# Patient Record
Sex: FEMALE | Race: WHITE | NOT HISPANIC OR LATINO | Employment: FULL TIME | URBAN - METROPOLITAN AREA
[De-identification: names, ages, dates, MRNs, and addresses within clinical notes are randomized per-mention and may not be internally consistent; named-entity substitution may affect disease eponyms.]

---

## 2021-04-04 PROBLEM — Z34.93 NORMAL PREGNANCY, THIRD TRIMESTER: Status: ACTIVE | Noted: 2021-04-04

## 2021-04-04 PROBLEM — O35.8XX0 PYELECTASIS OF FETUS ON PRENATAL ULTRASOUND: Status: ACTIVE | Noted: 2021-04-04

## 2021-04-04 PROBLEM — O35.EXX0 PYELECTASIS OF FETUS ON PRENATAL ULTRASOUND: Status: ACTIVE | Noted: 2021-04-04

## 2021-04-04 PROBLEM — R73.09 ABNORMAL GLUCOSE TOLERANCE TEST (GTT): Status: ACTIVE | Noted: 2021-04-04

## 2021-04-04 PROBLEM — Z86.69 HISTORY OF MIGRAINE DURING PREGNANCY: Status: ACTIVE | Noted: 2021-04-04

## 2021-04-04 PROBLEM — O09.299 HISTORY OF MATERNAL FOURTH DEGREE PERINEAL LACERATION, CURRENTLY PREGNANT: Status: ACTIVE | Noted: 2021-04-04

## 2021-04-04 PROBLEM — Z87.59 HISTORY OF MIGRAINE DURING PREGNANCY: Status: ACTIVE | Noted: 2021-04-04

## 2021-04-05 ENCOUNTER — ROUTINE PRENATAL (OUTPATIENT)
Dept: OBGYN CLINIC | Facility: CLINIC | Age: 31
End: 2021-04-05

## 2021-04-05 VITALS — DIASTOLIC BLOOD PRESSURE: 60 MMHG | WEIGHT: 172 LBS | SYSTOLIC BLOOD PRESSURE: 112 MMHG

## 2021-04-05 DIAGNOSIS — O35.8XX0 PYELECTASIS OF FETUS ON PRENATAL ULTRASOUND: ICD-10-CM

## 2021-04-05 DIAGNOSIS — Z34.93 NORMAL PREGNANCY, THIRD TRIMESTER: Primary | ICD-10-CM

## 2021-04-05 DIAGNOSIS — O09.299 HISTORY OF MATERNAL FOURTH DEGREE PERINEAL LACERATION, CURRENTLY PREGNANT: ICD-10-CM

## 2021-04-05 PROCEDURE — PNV: Performed by: STUDENT IN AN ORGANIZED HEALTH CARE EDUCATION/TRAINING PROGRAM

## 2021-04-05 RX ORDER — SUMATRIPTAN 100 MG/1
TABLET, FILM COATED ORAL
COMMUNITY

## 2021-04-05 NOTE — ASSESSMENT & PLAN NOTE
- Had previously desired elective  section  However, has now decided that she would like to proceed with trial of labor  Will cancel scheduled  section    - Given history, we discussed increased risk of complex obstetric laceration  Reviewed that risk of recurrence and range of sequelae are unpredictable  Given that she healed without residual symptoms following prior delivery, vaginal delivery is not unreasonable

## 2021-04-05 NOTE — Clinical Note
Patient has decided against elective primary  section and desires vaginal delivery  Please cancel previously-scheduled    Thanks

## 2021-04-05 NOTE — PROGRESS NOTES
Routine Prenatal Visit  [address]   [address] [facility], [place], [place]    Assessment/Plan:  She Yang is a 31y o  year old  at 31w1d who presents for routine prenatal visit  1  Normal pregnancy, third trimester  Assessment & Plan:  - PTL/PPROM/Bleeding precautions given  Kick counts reviewed  - Reviewed plan for collection of GBS swab at 36 weeks  - Problem list updated  - PMH, PSH, medications reviewed and updated as needed  - Return to office in 2 wk(s) for routine prenatal care        2  History of maternal fourth degree perineal laceration, currently pregnant  Assessment & Plan:  - Had previously desired elective  section  However, has now decided that she would like to proceed with trial of labor  Will cancel scheduled  section    - Given history, we discussed increased risk of complex obstetric laceration  Reviewed that risk of recurrence and range of sequelae are unpredictable  Given that she healed without residual symptoms following prior delivery, vaginal delivery is not unreasonable  3  Pyelectasis of fetus on prenatal ultrasound  Assessment & Plan:  - Will refer to MFM for consult and 3rd trimester ultrasound  Orders:  -     Ambulatory Referral to Maternal Fetal Medicine; Future; Expected date: 2021      Subjective:   Isaac Agusitn is a 31 y o  [facility] who presents for routine prenatal care at 34w4d  Complaints today: No  LOF: No; VB: No; Contractions: No; FM: Normal  Udip: Negative glucose, negative protein    Objective:  /60   Wt 78 kg (172 lb)   LMP 2020     General: Well appearing, no distress  Respiratory: Unlabored breathing  Cardiovascular: Regular rate  Abdomen: Soft, gravid, nontender  Fundal Height: 34 cm  Extremities: Warm and well perfused  Non tender  Pregravid Weight/BMI: Pregravid weight not on file (BMI Could not be calculated)  Current Weight: 78 kg (172 lb)   Total Weight Gain: Not found  Pre- Vitals      Most Recent Value   Prenatal Assessment   Movement  Present   Prenatal Vitals   Blood Pressure  112/60   Weight - Scale  78 kg (172 lb)   Urine Albumin/Glucose   Dilation/Effacement/Station   Vaginal Drainage   Edema           Mathieu Rosenberg MD  2021 2:20 PM

## 2021-04-05 NOTE — PATIENT INSTRUCTIONS
Pregnancy at [address] to [address]:   What changes are happening in my body? You may continue to have symptoms such as shortness of breath, heartburn, contractions, or swelling of your ankles and feet  You may be gaining about 1 pound a week now  How do I care for myself at this stage of my pregnancy? · Eat a variety of healthy foods  Healthy foods include fruits, vegetables, whole-grain breads, low-fat dairy foods, beans, lean meats, and fish  Drink liquids as directed  Ask how much liquid to drink each day and which liquids are best for you  Limit caffeine to less than 200 milligrams each day  Limit your intake of fish to 2 servings each week  Choose fish low in mercury such as canned light tuna, shrimp, salmon, cod, or tilapia  Do not  eat fish high in mercury such as swordfish, tilefish, ifrah mackerel, and shark  · Manage heartburn  by eating 4 or 5 small meals each day instead of large meals  Avoid spicy food  · Manage swelling  by lying down and putting your feet up  · Take prenatal vitamins as directed  Your need for certain vitamins and minerals, such as folic acid, increases during pregnancy  Prenatal vitamins provide some of the extra vitamins and minerals you need  Prenatal vitamins may also help to decrease the risk of certain birth defects  · Talk to your healthcare provider about exercise  Moderate exercise can help you stay fit  Your healthcare provider will help you plan an exercise program that is safe for you during pregnancy  · Do not smoke  Smoking increases your risk of a miscarriage and other health problems during your pregnancy  Smoking can cause your baby to be born too early or weigh less at birth  Ask your healthcare provider for information if you need help quitting  · Do not drink alcohol  Alcohol passes from your body to your baby through the placenta   It can affect your baby's brain development and cause fetal alcohol syndrome (FAS)  FAS is a group of conditions that causes mental, behavior, and growth problems  · Talk to your healthcare provider before you take any medicines  Many medicines may harm your baby if you take them when you are pregnant  Do not take any medicines, vitamins, herbs, or supplements without first talking to your healthcare provider  Never use illegal or street drugs (such as marijuana or cocaine) while you are pregnant  What are some safety tips during pregnancy? · Avoid hot tubs and saunas  Do not use a hot tub or sauna while you are pregnant, especially during your first trimester  Hot tubs and saunas may raise your baby's temperature and increase the risk of birth defects  · Avoid toxoplasmosis  This is an infection caused by eating raw meat or being around infected cat feces  It can cause birth defects, miscarriages, and other problems  Wash your hands after you touch raw meat  Make sure any meat is well-cooked before you eat it  Avoid raw eggs and unpasteurized milk  Use gloves or ask someone else to clean your cat's litter box while you are pregnant  What changes are happening with my baby? By 34 weeks, your baby may weigh more than 5 pounds  Your baby will be about 12 ½ inches long from the top of the head to the rump (baby's bottom)  Your baby is gaining about ½ pound a week  Your baby's eyes open and close now  Your baby's kicks and movements are more forceful at this time  What do I need to know about prenatal care? Your healthcare provider will check your blood pressure and weight  You may also need the following:  · A urine test  may also be done to check for sugar and protein  These can be signs of gestational diabetes or infection  Protein in your urine may also be a sign of preeclampsia  Preeclampsia is a condition that can develop during week 20 or later of your pregnancy  It causes high blood pressure, and it can cause problems with your kidneys and other organs       · A Tdap vaccine  may be recommended by your healthcare provider  · Fundal height  is a measurement of your uterus to check your baby's growth  This number is usually the same as the number of weeks that you have been pregnant  Your healthcare provider may also check your baby's position  · Your baby's heart rate  will be checked  When should I seek immediate care? · You develop a severe headache that does not go away  · You have new or increased vision changes, such as blurred or spotted vision  · You have new or increased swelling in your face or hands  · You have vaginal spotting or bleeding  · Your water broke or you feel warm water gushing or trickling from your vagina  When should I contact my healthcare provider? · You have more than 5 contractions in 1 hour  · You notice any changes in your baby's movements  · You have abdominal cramps, pressure, or tightening  · You have a change in vaginal discharge  · You have chills or a fever  · You have vaginal itching, burning, or pain  · You have yellow, green, white, or foul-smelling vaginal discharge  · You have pain or burning when you urinate, less urine than usual, or pink or bloody urine  · You have questions or concerns about your condition or care  CARE AGREEMENT:   You have the right to help plan your care  Learn about your health condition and how it may be treated  Discuss treatment options with your healthcare providers to decide what care you want to receive  You always have the right to refuse treatment  The above information is an  only  It is not intended as medical advice for individual conditions or treatments  Talk to your doctor, nurse or pharmacist before following any medical regimen to see if it is safe and effective for you  © Copyright [address] Information is for End User's use only and may not be sold, redistributed or otherwise used for commercial purposes   All illustrations and images included in CareNotes® are the copyrighted property of A D A M , Inc  or Grzegorz Eaton

## 2021-04-05 NOTE — ASSESSMENT & PLAN NOTE
- PTL/PPROM/Bleeding precautions given  Kick counts reviewed  - Reviewed plan for collection of GBS swab at 36 weeks    - Problem list updated  - PMH, PSH, medications reviewed and updated as needed  - Return to office in 2 wk(s) for routine prenatal care

## 2021-04-06 ENCOUNTER — TELEPHONE (OUTPATIENT)
Dept: PERINATAL CARE | Facility: CLINIC | Age: 31
End: 2021-04-06

## 2021-04-06 NOTE — TELEPHONE ENCOUNTER
I called patient to offer an appointment for 4/15/21 in our [place] office  I left her the appointment information and advised her to call us back to confirm

## 2021-04-13 ENCOUNTER — ROUTINE PRENATAL (OUTPATIENT)
Dept: OBGYN CLINIC | Facility: CLINIC | Age: 31
End: 2021-04-13

## 2021-04-13 VITALS — DIASTOLIC BLOOD PRESSURE: 66 MMHG | SYSTOLIC BLOOD PRESSURE: 110 MMHG | WEIGHT: 171.6 LBS

## 2021-04-13 DIAGNOSIS — O09.299 HISTORY OF MATERNAL FOURTH DEGREE PERINEAL LACERATION, CURRENTLY PREGNANT: ICD-10-CM

## 2021-04-13 DIAGNOSIS — R73.09 ABNORMAL GLUCOSE TOLERANCE TEST (GTT): ICD-10-CM

## 2021-04-13 DIAGNOSIS — Z87.59 HISTORY OF MIGRAINE DURING PREGNANCY: ICD-10-CM

## 2021-04-13 DIAGNOSIS — Z34.93 NORMAL PREGNANCY, THIRD TRIMESTER: ICD-10-CM

## 2021-04-13 DIAGNOSIS — Z3A.35 35 WEEKS GESTATION OF PREGNANCY: Primary | ICD-10-CM

## 2021-04-13 DIAGNOSIS — O35.8XX0 PYELECTASIS OF FETUS ON PRENATAL ULTRASOUND: ICD-10-CM

## 2021-04-13 DIAGNOSIS — Z86.69 HISTORY OF MIGRAINE DURING PREGNANCY: ICD-10-CM

## 2021-04-13 LAB
SL AMB  POCT GLUCOSE, UA: NEGATIVE
SL AMB POCT URINE PROTEIN: NEGATIVE

## 2021-04-13 PROCEDURE — PNV: Performed by: OBSTETRICS & GYNECOLOGY

## 2021-04-13 NOTE — PATIENT INSTRUCTIONS
LABOR PRECAUTIONS  Call our office for any of the following:    * I need to call immediately I if I have even a small amount of LIQUID  leaking from my vagina, with or without contractions  * I need to call if I am BLEEDING an amount equal to or more than a period  A small amount of bloody vaginal discharge is normal at the end of the pregnancy  * I need to call if I am having CONTRACTIONS  every five minutes for at least an hour  I will need a watch in order to time them  I should time them from the beginning of one contraction until the beginning of the next one  * I need to call BEFORE  I go to the hospital    * I need to have a plan for TRANSPORTATION  to get to the hospital when I am in labor  Labor is generally not an emergency which requires an ambulance  FETAL KICK COUNTS    In the third trimester (after 28 weeks gestation) you should be performing fetal kick counts every day  Your baby should move at least 10 times in 2 hours during an active time, once a day  Choose atime of day when your baby is most active  Try to do this around the same time each day  Get into a comfortable position and then write down the time your baby first moves  Count each movement until the baby moves 10 times  These movements include kicks, punches, nudges, flutters, or rolls  This can take anywhere from 5 minutes to 2 hours  Write down the time you feel the baby's 10th movement  If 2 hours has passed and your baby has not moved at least 10 times, you should CALL THE OFFICE RIGHT AWAY  PERINEAL / VAGINAL MASSAGE    What can I do now to decrease my chances of tearing during delivery? Massaging around the vaginal opening by you (or your partner), either antepartum (before birth) or during the second stage of labor, can reduce the likelihood of perineal tearing during childbirth    Likewise, the use of warm packs held on the perineum during the pushing stage of labor can reduce the severity of your tear  This will happen during the pushing stage of labor  At home, you can also help reduce the chances of injury that may occur during the birth of your child through perineal massage  When should I do this? Starting around or shortly after 34 weeks of pregnancy, you or your partner should provide 5-10 minutes of vaginal massage 1-4 times per week  How? Use either almond, coconut, or olive oil and water mixture on 1 or 2 fingers (depending on comfort)  Insert finger(s) 3-5cm into the vagina  Apply sweeping downward/sideward pressure from 3 to 9 o'clock for 5-10 minutes, 1-4 times per week  GROUP B STREP    Group B Strep (GBS) is a common vaginal bacteria  Approximately 25% of women normally have GBS bacteria present in the vagina  It is NOT a sexually-transmitted infection  In fact, it is not an infection AT ALL! It is just a normal vaginal bacteria for many women  However, the GBS bacteria can be dangerous to a  baby if the baby is exposed to that particular bacteria during labor and birth AND develops an infection from it  The likelihood of a  GBS infection for a woman who has GBS bacteria in the vagina is about 1%-2%  But if it does occur, a baby could become severely ill  For this reason, we do a vaginal culture (Q-tip swab of the vagina and rectum) for ALL pregnant women at approximately 36 weeks of pregnancy  If the culture shows that there is GBS bacteria present, it is NOT a reason to panic! Because in this situation we will give this woman antibiotics through her IV while she is in labor  When a mother is treated with antibiotics during labor and delivery, her baby ALMOST NEVER becomes infected with GBS bacteria

## 2021-04-13 NOTE — PROGRESS NOTES
Routine Prenatal Visit  [address], [address], [facility], [address] [facility]    Assessment/Plan:  William Sigala is a 31y o  year old  at 27w7d who presents for routine prenatal visit  1  35 weeks gestation of pregnancy  -     POCT urine dip    2  History of maternal fourth degree perineal laceration, currently pregnant    3  Normal pregnancy, third trimester    4  Abnormal glucose tolerance test (GTT)    5  Pyelectasis of fetus on prenatal ultrasound    6  History of migraine during pregnancy          Subjective:     CC: Prenatal care    Vin Dykes is a 31 y o   female who presents for routine prenatal care at 35w5d  Pregnancy ROS: No leakage of fluid, pelvic pain, or vaginal bleeding  Nml fetal movement  The following portions of the patient's history were reviewed and updated as appropriate: allergies, current medications, past family history, past medical history, obstetric history, gynecologic history, past social history, past surgical history and problem list       Objective:  /66   Wt 77 8 kg (171 lb 9 6 oz)   LMP 2020   Pregravid Weight/BMI: Pregravid weight not on file (BMI Could not be calculated)  Current Weight: 77 8 kg (171 lb 9 6 oz)   Total Weight Gain: Not found  Pre-Linnea Vitals      Most Recent Value   Prenatal Assessment   Fetal Heart Rate  150   Fundal Height (cm)  36 cm   Movement  Present   Presentation  Vertex   Prenatal Vitals   Blood Pressure  110/66   Weight - Scale  77 8 kg (171 lb 9 6 oz)   Urine Albumin/Glucose   Dilation/Effacement/Station   Vaginal Drainage   Draining Fluid  No   Edema           General: Well appearing, no distress  Abdomen: Soft, gravid, nontender  Fundal Height: Appropriate for gestational age  Extremities: Warm and well perfused  Non tender

## 2021-04-15 ENCOUNTER — ROUTINE PRENATAL (OUTPATIENT)
Dept: PERINATAL CARE | Facility: OTHER | Age: 31
End: 2021-04-15
Payer: COMMERCIAL

## 2021-04-15 VITALS
WEIGHT: 170.6 LBS | SYSTOLIC BLOOD PRESSURE: 118 MMHG | HEIGHT: 65 IN | HEART RATE: 89 BPM | DIASTOLIC BLOOD PRESSURE: 81 MMHG | BODY MASS INDEX: 28.42 KG/M2

## 2021-04-15 DIAGNOSIS — O09.299 HISTORY OF MATERNAL FOURTH DEGREE PERINEAL LACERATION, CURRENTLY PREGNANT: ICD-10-CM

## 2021-04-15 DIAGNOSIS — O35.8XX0 PYELECTASIS OF FETUS ON PRENATAL ULTRASOUND: ICD-10-CM

## 2021-04-15 DIAGNOSIS — R73.09 ABNORMAL GLUCOSE TOLERANCE TEST (GTT): ICD-10-CM

## 2021-04-15 DIAGNOSIS — Z86.69 HISTORY OF MIGRAINE DURING PREGNANCY: ICD-10-CM

## 2021-04-15 DIAGNOSIS — Z87.59 HISTORY OF MIGRAINE DURING PREGNANCY: ICD-10-CM

## 2021-04-15 DIAGNOSIS — O36.63X0 EXCESSIVE FETAL GROWTH AFFECTING MANAGEMENT OF PREGNANCY IN THIRD TRIMESTER, SINGLE OR UNSPECIFIED FETUS: Primary | ICD-10-CM

## 2021-04-15 DIAGNOSIS — Z34.93 NORMAL PREGNANCY, THIRD TRIMESTER: ICD-10-CM

## 2021-04-15 DIAGNOSIS — Z3A.36 36 WEEKS GESTATION OF PREGNANCY: ICD-10-CM

## 2021-04-15 PROBLEM — F41.9 ANXIETY: Status: ACTIVE | Noted: 2021-04-15

## 2021-04-15 PROCEDURE — 99203 OFFICE O/P NEW LOW 30 MIN: CPT | Performed by: OBSTETRICS & GYNECOLOGY

## 2021-04-15 PROCEDURE — 76811 OB US DETAILED SNGL FETUS: CPT | Performed by: OBSTETRICS & GYNECOLOGY

## 2021-04-15 NOTE — LETTER
April 15, 2021     MD [address]  [address] [facility] [address]  [facility] [address]    Patient: Courtney Ford   YOB: 1990   Date of Visit: 4/15/2021       Dear Dr Nic Sorto:    Thank you for referring Courtney Ford to me for evaluation  Below are my notes for this consultation  If you have questions, please do not hesitate to call me  I look forward to following your patient along with you  Sincerely,        Dallin Evangelista MD        CC: No Recipients  Dallin Evangelista MD  4/15/2021  4:23 PM  Sign when Signing Visit    On exam today the patient appears well, in no acute distress, and denies any complaints  Her abdomen is non-tender  Her history was reviewed  This is her 2nd pregnancy  Pregnancy has been complicated by bilateral fetal pyelectasis noted at an office ultrasound  She currently takes Zoloft for depression and prenatal vitamins  She has no known drug allergies  She has a history of a prior vaginal delivery complicated by 4th degree laceration in 2018  That delivery occurred at 41 weeks gestation  She has no significant substance use or family medical history  Review of systems is otherwise negative  Today's ultrasound is limited by gestational age and fetal position; therefore, the fetal anatomic survey could not be completed  Mild right-sided urinary tract dilation is appreciated today  Patient informed me that prior ultrasounds demonstrated bilateral pyelectasis  Good fetal movement and tone are seen  The amniotic fluid volume appears normal   The overall estimated fetal weight is at the 97th percentile with the abdominal circumference greater than the 97th percentile  She was informed of today's findings and all of her questions were answered  The limitations of ultrasound were reviewed  We discussed today's biometry and biometry is consistent with impending macrosomia    We discussed that a large abdominal circumference is seen more commonly in patients with poorly controlled diabetes  She does not have diabetes  She does inform me that she underwent a 3 hour glucose tolerance test and did have 1 elevated level  I discussed with her that recent data suggest that a single elevated level on a 3 hour glucose tolerance test may have similar implications to a traditional diagnosis of gestational diabetes  Given her gestational age, I recommend she improve her diet by lowering carbohydrate intake and improving protein intake  I encouraged her to further discuss with her doctor mode of delivery given her prior 4th degree laceration  She had previously planned  delivery  We discussed follow-up in detail and I recommend she return as clinically indicated  I did suggest notification of Pediatrics to ensure  evaluation of the urinary tract dilation occurs at 4-6 weeks of life    Please note, medical decision making complexity:  Moderate    Thank you very much for allowing us to participate in the care of this very nice patient  Should you have any questions, please do not hesitate to contact me

## 2021-04-15 NOTE — PROGRESS NOTES
On exam today the patient appears well, in no acute distress, and denies any complaints  Her abdomen is non-tender  Her history was reviewed  This is her 2nd pregnancy  Pregnancy has been complicated by bilateral fetal pyelectasis noted at an office ultrasound  She currently takes Zoloft for depression and prenatal vitamins  She has no known drug allergies  She has a history of a prior vaginal delivery complicated by 4th degree laceration in 2018  That delivery occurred at 41 weeks gestation  She has no significant substance use or family medical history  Review of systems is otherwise negative  Today's ultrasound is limited by gestational age and fetal position; therefore, the fetal anatomic survey could not be completed  Mild right-sided urinary tract dilation is appreciated today  Patient informed me that prior ultrasounds demonstrated bilateral pyelectasis  Good fetal movement and tone are seen  The amniotic fluid volume appears normal   The overall estimated fetal weight is at the 97th percentile with the abdominal circumference greater than the 97th percentile  She was informed of today's findings and all of her questions were answered  The limitations of ultrasound were reviewed  We discussed today's biometry and biometry is consistent with impending macrosomia  We discussed that a large abdominal circumference is seen more commonly in patients with poorly controlled diabetes  She does not have diabetes  She does inform me that she underwent a 3 hour glucose tolerance test and did have 1 elevated level  I discussed with her that recent data suggest that a single elevated level on a 3 hour glucose tolerance test may have similar implications to a traditional diagnosis of gestational diabetes  Given her gestational age, I recommend she improve her diet by lowering carbohydrate intake and improving protein intake    I encouraged her to further discuss with her doctor mode of delivery given her prior 4th degree laceration  She had previously planned  delivery  We discussed follow-up in detail and I recommend she return as clinically indicated  I did suggest notification of Pediatrics to ensure  evaluation of the urinary tract dilation occurs at 4-6 weeks of life    Please note, medical decision making complexity:  Moderate    Thank you very much for allowing us to participate in the care of this very nice patient  Should you have any questions, please do not hesitate to contact me

## 2021-04-17 PROBLEM — O36.63X1 LARGE FOR GESTATIONAL AGE FETUS AFFECTING MANAGEMENT OF MOTHER, ANTEPARTUM, THIRD TRIMESTER, FETUS 1: Status: ACTIVE | Noted: 2021-04-17

## 2021-04-20 ENCOUNTER — ROUTINE PRENATAL (OUTPATIENT)
Dept: OBGYN CLINIC | Facility: CLINIC | Age: 31
End: 2021-04-20

## 2021-04-20 VITALS
WEIGHT: 174 LBS | DIASTOLIC BLOOD PRESSURE: 72 MMHG | HEIGHT: 65 IN | SYSTOLIC BLOOD PRESSURE: 120 MMHG | BODY MASS INDEX: 28.99 KG/M2

## 2021-04-20 DIAGNOSIS — O36.63X0 EXCESSIVE FETAL GROWTH AFFECTING MANAGEMENT OF PREGNANCY IN THIRD TRIMESTER, SINGLE OR UNSPECIFIED FETUS: Primary | ICD-10-CM

## 2021-04-20 DIAGNOSIS — O35.8XX0 PYELECTASIS OF FETUS ON PRENATAL ULTRASOUND: ICD-10-CM

## 2021-04-20 DIAGNOSIS — Z3A.36 36 WEEKS GESTATION OF PREGNANCY: ICD-10-CM

## 2021-04-20 DIAGNOSIS — R73.09 ABNORMAL GLUCOSE TOLERANCE TEST (GTT): ICD-10-CM

## 2021-04-20 DIAGNOSIS — O09.299 HISTORY OF MATERNAL FOURTH DEGREE PERINEAL LACERATION, CURRENTLY PREGNANT: ICD-10-CM

## 2021-04-20 LAB
SL AMB  POCT GLUCOSE, UA: NEGATIVE
SL AMB POCT URINE PROTEIN: NEGATIVE

## 2021-04-20 PROCEDURE — PNV: Performed by: OBSTETRICS & GYNECOLOGY

## 2021-04-20 NOTE — PROGRESS NOTES
Routine Prenatal Visit  [address] [facility], [address], [facility], [address] [facility] [address]    Assessment/Plan:  She Yang is a 31y o  year old  at 44w9d who presents for routine prenatal visit  1  Excessive fetal growth affecting management of pregnancy in third trimester, single or unspecified fetus  Assessment & Plan:  EFW on U/S was 3501gm (97%) with the AC >97%  Pt would like to reschedule the C/S that she recently canceled  Will notify scheduling      2  History of maternal fourth degree perineal laceration, currently pregnant  Assessment & Plan:  Pt has now decided to reschedule C/S in light of recent LGA U/S  Should she go into spontaneous labor prior she may consider       3  Abnormal glucose tolerance test (GTT)    4  Pyelectasis of fetus on prenatal ultrasound    5  36 weeks gestation of pregnancy  Comments:  delivery consent obtained, GBS done  Orders:  -     POCT urine dip  -     Strep B DNA probe, amplification        Subjective:     CC: Prenatal care    Isaac Agustin is a 31 y o   female who presents for routine prenatal care at 36w5d  Pregnancy ROS: no leakage of fluid, pelvic pain, or vaginal bleeding  normal fetal movement  The following portions of the patient's history were reviewed and updated as appropriate: allergies, current medications, past family history, past medical history, obstetric history, gynecologic history, past social history, past surgical history and problem list       Objective:  /72   Ht 5' 5" (1 651 m)   Wt 78 9 kg (174 lb)   LMP 2020   BMI 28 96 kg/m²   Pregravid Weight/BMI: Pregravid weight not on file (BMI Could not be calculated)  Current Weight: 78 9 kg (174 lb)   Total Weight Gain: Not found     Pre-Linnea Vitals      Most Recent Value   Prenatal Assessment   Fetal Heart Rate  155   Fundal Height (cm)  38 cm   Movement  Present   Presentation  Vertex   Prenatal Vitals   Blood Pressure  120/72   Weight - Scale  78 9 kg (174 lb)   Urine Albumin/Glucose   Dilation/Effacement/Station   Cervical Dilation  2   Cervical Effacement  50   Fetal Station  -3   Vaginal Drainage   Edema           General: Well appearing, no distress  Respiratory: Unlabored breathing  Cardiovascular: Regular rate  Abdomen: Soft, gravid, nontender  Fundal Height: Appropriate for gestational age  Extremities: Warm and well perfused  Non tender

## 2021-04-20 NOTE — ASSESSMENT & PLAN NOTE
Pt has now decided to reschedule C/S in light of recent LGA U/S   Should she go into spontaneous labor prior she may consider

## 2021-04-21 NOTE — ASSESSMENT & PLAN NOTE
EFW on U/S was 3501gm (97%) with the AC >97%  Pt would like to reschedule the C/S that she recently canceled    Will notify scheduling

## 2021-04-22 LAB — GP B STREP DNA SPEC QL NAA+PROBE: NEGATIVE

## 2021-04-28 ENCOUNTER — ROUTINE PRENATAL (OUTPATIENT)
Dept: OBGYN CLINIC | Facility: CLINIC | Age: 31
End: 2021-04-28

## 2021-04-28 VITALS
BODY MASS INDEX: 28.52 KG/M2 | WEIGHT: 171.2 LBS | HEIGHT: 65 IN | SYSTOLIC BLOOD PRESSURE: 122 MMHG | DIASTOLIC BLOOD PRESSURE: 60 MMHG

## 2021-04-28 DIAGNOSIS — R73.09 ABNORMAL GLUCOSE TOLERANCE TEST (GTT): ICD-10-CM

## 2021-04-28 DIAGNOSIS — Z34.93 NORMAL PREGNANCY, THIRD TRIMESTER: Primary | ICD-10-CM

## 2021-04-28 DIAGNOSIS — O36.63X0 EXCESSIVE FETAL GROWTH AFFECTING MANAGEMENT OF PREGNANCY IN THIRD TRIMESTER, SINGLE OR UNSPECIFIED FETUS: ICD-10-CM

## 2021-04-28 DIAGNOSIS — O09.299 HISTORY OF MATERNAL FOURTH DEGREE PERINEAL LACERATION, CURRENTLY PREGNANT: ICD-10-CM

## 2021-04-28 DIAGNOSIS — O35.8XX0 PYELECTASIS OF FETUS ON PRENATAL ULTRASOUND: ICD-10-CM

## 2021-04-28 DIAGNOSIS — O36.63X1 LARGE FOR GESTATIONAL AGE FETUS AFFECTING MANAGEMENT OF MOTHER, ANTEPARTUM, THIRD TRIMESTER, FETUS 1: ICD-10-CM

## 2021-04-28 DIAGNOSIS — Z3A.37 37 WEEKS GESTATION OF PREGNANCY: ICD-10-CM

## 2021-04-28 DIAGNOSIS — Z86.69 HISTORY OF MIGRAINE DURING PREGNANCY: ICD-10-CM

## 2021-04-28 DIAGNOSIS — Z87.59 HISTORY OF MIGRAINE DURING PREGNANCY: ICD-10-CM

## 2021-04-28 LAB
SL AMB  POCT GLUCOSE, UA: NEGATIVE
SL AMB POCT URINE PROTEIN: NEGATIVE

## 2021-04-28 PROCEDURE — PNV: Performed by: STUDENT IN AN ORGANIZED HEALTH CARE EDUCATION/TRAINING PROGRAM

## 2021-04-28 NOTE — PATIENT INSTRUCTIONS
AMBULATORY CARE:   What you need to know about a :  A , or  section, is abdominal surgery to deliver your baby  How to prepare for a :   · Your obstetrician will talk to you about how to prepare if you are having a planned   He or she may tell you not to eat or drink anything after midnight on the day of your surgery  He or she will tell you what medicines to take or not take on the day of your surgery  · Tell your healthcare provider if you had an allergic reaction to anesthesia or antibiotics  Antibiotics may be given an hour before surgery or right after it starts  Antibiotics help fight or prevent a bacterial infection  You may need tests for certain infections that can be passed to your baby, such as group B strep (GBS)  If you are a GBS carrier or at increased risk, antibiotics help prevent your baby from being infected during the   What will happen during a :  You will usually be given spinal anesthesia to numb you from the surgery area down  You may still feel pressure or pushing during the , but you should not feel any pain  Your obstetrician will usually make an incision across your lower abdomen  He or she will gently pull your baby out  Your incision will be closed with stitches or staples and covered with a bandage  What will happen after a :   · You will be taken to a room to rest for about an hour after you deliver  · Do not get out of bed until healthcare providers say it is okay  · Call for a healthcare provider if you are holding your baby and start to feel tired  The provider can put him or her in a bassinet near you while you rest or sleep  This will help prevent an accidental drop or fall of your baby  Risks of a :  You may bleed more than expected or develop an infection  Your bladder or intestines may be injured during the procedure  You may get a blood clot in your leg   This may become life-threatening  Call your local emergency number (911 in the [address]) if:   · You feel lightheaded, short of breath, and have chest pain  · You cough up blood  Call your obstetrician if:   · Blood soaks through your bandage  · Your stitches come apart  · Your arm or leg feels warm, tender, and painful  It may look swollen and red  · You have heavy vaginal bleeding that fills 1 or more sanitary pads in 1 hour  · You have a fever  · Your incision is swollen, red, or draining pus  · You have questions or concerns about yourself or your baby  Medicines: You may  need any of the following:  · Prescription pain medicine  may be given  Ask your healthcare provider how to take this medicine safely  Some prescription pain medicines contain acetaminophen  Do not take other medicines that contain acetaminophen without talking to your healthcare provider  Too much acetaminophen may cause liver damage  Prescription pain medicine may cause constipation  Ask your healthcare provider how to prevent or treat constipation  · Acetaminophen  decreases pain and fever  It is available without a doctor's order  Ask how much to take and how often to take it  Follow directions  Read the labels of all other medicines you are using to see if they also contain acetaminophen, or ask your doctor or pharmacist  Acetaminophen can cause liver damage if not taken correctly  Do not use more than 4 grams (4,000 milligrams) total of acetaminophen in one day  · NSAIDs , such as ibuprofen, help decrease swelling, pain, and fever  NSAIDs can cause stomach bleeding or kidney problems in certain people  If you take blood thinner medicine, always ask your healthcare provider if NSAIDs are safe for you  Always read the medicine label and follow directions  Wound care:  Carefully wash your incision wound with soap and water every day  Keep your wound clean and dry   Wear loose, comfortable clothes that do not rub against your wound  Ask about bathing and showering  Limit activity as directed:   · Ask when it is safe for you to drive, walk up stairs, lift heavy objects, and have sex  · Ask when it is okay to exercise, and what types of exercise to do  Start slowly and do more as you get stronger  Drink liquids as directed:  Liquids help keep you hydrated after your procedure and decrease your risk for a blood clot  Ask how much liquid to drink each day and which liquids are best for you  Follow up with your obstetrician as directed: You may need to return to have your stitches or staples removed  Write down your questions so you remember to ask them during your visits  © Copyright [address] Information is for End User's use only and may not be sold, redistributed or otherwise used for commercial purposes  All illustrations and images included in CareNotes® are the copyrighted property of A D A M , Inc  or [facility] Tammi Cornell   The above information is an  only  It is not intended as medical advice for individual conditions or treatments  Talk to your doctor, nurse or pharmacist before following any medical regimen to see if it is safe and effective for you

## 2021-04-28 NOTE — ASSESSMENT & PLAN NOTE
- Labor/Bleeding/ROM precautions given  Kick counts reviewed  - GBS negative result reviewed  - Scheduled for primary  section 2021 due to [facility] and history of 4th degree laceration  Surgical consent reviewed and signed with patient today  Discussed risks of surgery, including bleeding, infection, damage to surrounding organs/structures (specifically vessels, nerves, bowel, bladder), scar tissue formation, hernia, and need for further surgery  All questions answered  Patient agrees to proceed with surgery     - Problem list updated  - PMH, PSH, medications reviewed and updated as needed  - Return to office in 1 wk(s) for routine prenatal care

## 2021-04-28 NOTE — PROGRESS NOTES
Routine Prenatal Visit  [address] [facility], [address], [facility], [address] [facility]    Assessment/Plan:  Ketan Mendez is a 31y o  year old  at 41w10d who presents for routine prenatal visit  1  Normal pregnancy, third trimester  Assessment & Plan:  - Labor/Bleeding/ROM precautions given  Kick counts reviewed  - GBS negative result reviewed  - Scheduled for primary  section 2021 due to [facility] and history of 4th degree laceration  Surgical consent reviewed and signed with patient today  Discussed risks of surgery, including bleeding, infection, damage to surrounding organs/structures (specifically vessels, nerves, bowel, bladder), scar tissue formation, hernia, and need for further surgery  All questions answered  Patient agrees to proceed with surgery  - Problem list updated  - PMH, PSH, medications reviewed and updated as needed  - Return to office in 1 wk(s) for routine prenatal care        2  History of maternal fourth degree perineal laceration, currently pregnant    3  Abnormal glucose tolerance test (GTT)    4  Pyelectasis of fetus on prenatal ultrasound    5  History of migraine during pregnancy    6  Large for gestational age fetus affecting management of mother, antepartum, third trimester, fetus 1    9  40 weeks gestation of pregnancy  -     POCT urine dip    8  Excessive fetal growth affecting management of pregnancy in third trimester, single or unspecified fetus        Subjective:   Traci Regalado is a 31 y o  Pollie Mages who presents for routine prenatal care at 37w6d  Complaints today: No  LOF: No; VB: No; Contractions: No; FM: Present and normal    Objective:  /60   Ht 5' 5" (1 651 m)   Wt 77 7 kg (171 lb 3 2 oz)   LMP 2020   BMI 28 49 kg/m²     General: Well appearing, no distress  Respiratory: Unlabored breathing  Cardiovascular: Regular rate  Abdomen: Soft, gravid, nontender  Extremities: Warm and well perfused  Non tender      Pregravid Weight/BMI: 65 3 kg (144 lb) (BMI 23 96)  Current Weight: 77 7 kg (171 lb 3 2 oz)   Total Weight Gain: 12 3 kg (27 lb 3 2 oz)     Pre- Vitals      Most Recent Value   Prenatal Assessment   Fetal Heart Rate  135   Fundal Height (cm)  38 cm   Movement  Present   Presentation  Vertex   Prenatal Vitals   Blood Pressure  122/60   Weight - Scale  77 7 kg (171 lb 3 2 oz)   Urine Albumin/Glucose   Dilation/Effacement/Station   Vaginal Drainage   Draining Fluid  No   Edema   LLE Edema  Trace   RLE Edema  Trace   Facial Edema  None           Maryam Borges MD  2021 7:47 PM

## 2021-05-03 ENCOUNTER — TELEPHONE (OUTPATIENT)
Dept: OBGYN CLINIC | Facility: CLINIC | Age: 31
End: 2021-05-03

## 2021-05-03 ENCOUNTER — DOCUMENTATION (OUTPATIENT)
Dept: OBGYN CLINIC | Facility: CLINIC | Age: 31
End: 2021-05-03

## 2021-05-03 DIAGNOSIS — Z86.69 HISTORY OF MIGRAINE DURING PREGNANCY: ICD-10-CM

## 2021-05-03 DIAGNOSIS — O35.8XX0 PYELECTASIS OF FETUS ON PRENATAL ULTRASOUND: ICD-10-CM

## 2021-05-03 DIAGNOSIS — O36.63X0 EXCESSIVE FETAL GROWTH AFFECTING MANAGEMENT OF PREGNANCY IN THIRD TRIMESTER, SINGLE OR UNSPECIFIED FETUS: ICD-10-CM

## 2021-05-03 DIAGNOSIS — O36.63X1 LARGE FOR GESTATIONAL AGE FETUS AFFECTING MANAGEMENT OF MOTHER, ANTEPARTUM, THIRD TRIMESTER, FETUS 1: ICD-10-CM

## 2021-05-03 DIAGNOSIS — Z34.93 NORMAL PREGNANCY, THIRD TRIMESTER: ICD-10-CM

## 2021-05-03 DIAGNOSIS — O09.299 HISTORY OF MATERNAL FOURTH DEGREE PERINEAL LACERATION, CURRENTLY PREGNANT: Primary | ICD-10-CM

## 2021-05-03 DIAGNOSIS — Z87.59 HISTORY OF MIGRAINE DURING PREGNANCY: ICD-10-CM

## 2021-05-03 DIAGNOSIS — O09.299 HISTORY OF MATERNAL FOURTH DEGREE PERINEAL LACERATION, CURRENTLY PREGNANT: ICD-10-CM

## 2021-05-03 DIAGNOSIS — Z3A.38 38 WEEKS GESTATION OF PREGNANCY: ICD-10-CM

## 2021-05-03 DIAGNOSIS — R73.09 ABNORMAL GLUCOSE TOLERANCE TEST (GTT): ICD-10-CM

## 2021-05-03 NOTE — TELEPHONE ENCOUNTER
Patient returned call she stating her contractions are getting closer together 5-7 mins apart and lasting for 30 sec-1 min now  She still denies LOF, but noticing decrease fetal movement now  Advised for her to proceed to L/D at [facility] now for further eval  Pt states she is 5 mins away from the hospital and will head over now  Called Dr Vish Arnold (on-call) and provided report  Pt's delivery consent form with Epic pregnancy episode summary faxed to [address] [place] [address] L/D with fax confirmation

## 2021-05-03 NOTE — TELEPHONE ENCOUNTER
Patient called stating she is unsure if she need to go to L/D or not  Starting at 9 am she having contractions about 10-15 mins apart and lasting about 30-40 seconds  She states the contractions is not to bad and she can talk through them  Denies LOF, and noted good fetal movement  She states currently 38 4 wks, scheduled for a  for this 21 and this is her second baby  She is currently at work right now  Advised pt will contact Dr Katty Velazquez (on-call) for recommendation and will call her back  Spoke w/ Dr Katty Velazquez and he states to have patient to continue to monitor and if her contractions are closer together about 5-7 mins apart and lasting for one minute or/and having LOF to please proceed straight to L/D  Called and spoke with pt and advised of the recommendations to continue to monitor  Pt voiced appreciation

## 2021-06-22 ENCOUNTER — POSTPARTUM VISIT (OUTPATIENT)
Dept: OBGYN CLINIC | Facility: CLINIC | Age: 31
End: 2021-06-22

## 2021-06-22 VITALS
BODY MASS INDEX: 25.79 KG/M2 | DIASTOLIC BLOOD PRESSURE: 68 MMHG | HEIGHT: 65 IN | SYSTOLIC BLOOD PRESSURE: 118 MMHG | WEIGHT: 154.8 LBS

## 2021-06-22 PROCEDURE — 99024 POSTOP FOLLOW-UP VISIT: CPT | Performed by: OBSTETRICS & GYNECOLOGY

## 2021-06-26 NOTE — PROGRESS NOTES
[address], [address], [facility], [address] [facility]    Assessment/Plan:  Keyana Cisneros is a 31y o  year old  who presents for postpartum visit  Routine Postpartum Care  1  Normal postpartum exam  2  Contraception: condoms  3  Depression Sbreast  4  Psychosocial support: good  5  Patient Education: "[facility] [employer]"  6  Cervical cancer screening Up to Date, next due   7  Follow up in: 3 months or as needed  Additional Problems: There are no diagnoses linked to this encounter  Subjective:     CC: Postpartum visit    Mavis Fragoso is a 31 y o  y o  female  who presents for a postpartum visit  She is 7 weeks postpartum following a low cervical transverse  section on 5/3/21 at 38 weeks  Outcome: primary  section, low transverse incision  Anesthesia: spinal  Postpartum course has been unremarkable  Baby's course has been unremarkable  Baby is feeding by breast      Bleeding no bleeding  Bowel function is normal  Bladder function is normal  Patient is not sexually active  Contraception method is condoms  Postpartum depression screening: negative      The following portions of the patient's history were reviewed and updated as appropriate: allergies, current medications, past family history, past medical history, obstetric history, gynecologic history, past social history, past surgical history and problem list       Objective:  /68 (BP Location: Left arm, Patient Position: Sitting, Cuff Size: Standard)   Ht 5' 4 75" (1 645 m)   Wt 70 2 kg (154 lb 12 8 oz)   LMP 2020   Breastfeeding Yes   BMI 25 96 kg/m²   Pregravid Weight/BMI: 65 3 kg (144 lb) (BMI 24 14)  Current Weight: 70 2 kg (154 lb 12 8 oz)   Total Weight Gain: 4 899 kg (10 lb 12 8 oz)   Pre- Vitals      Most Recent Value   Prenatal Assessment   Prenatal Vitals   Blood Pressure  118/68   Weight - Scale  70 2 kg (154 lb 12 8 oz)   Urine Albumin/Glucose   Dilation/Effacement/Station   Vaginal Drainage   Edema           General: Well appearing, no distress  Mood and affect: Appropriate  Abdomen: Soft, nontender  Thyroid: No masses  Incision: well healed  Vulva: Well healed  Vagina: Well healed  No lesions  Urethra: Normal  Cervix: Healed, no lesions  Uterus: Normal size, no masses  Adnexa: No pain or masses  Extremities: Warm and well perfused  Non tender

## 2021-07-09 NOTE — PROGRESS NOTES
Delivered viable male @ 1904 8 and 9 apgars via LTCS, done for history of 4th degree laceration  Pt presented in spontaneous labor   Assist Dr Pool

## 2021-09-02 ENCOUNTER — TELEPHONE (OUTPATIENT)
Dept: OBGYN CLINIC | Facility: CLINIC | Age: 31
End: 2021-09-02

## 2021-09-02 NOTE — TELEPHONE ENCOUNTER
Pt called to inquire if a new order from [employer]  for a breast pump had poly received  Pt reports her old breast pump is no longer working and [employer] will provider a new breast pump but will need a new order  Order has not been received, pt will have [employer] refax an order

## 2021-09-27 ENCOUNTER — TELEPHONE (OUTPATIENT)
Dept: OBGYN CLINIC | Facility: CLINIC | Age: 31
End: 2021-09-27

## 2021-09-27 NOTE — TELEPHONE ENCOUNTER
[facility]   [facility]ed to [facility] to [facility] to chart

## 2021-09-28 ENCOUNTER — ANNUAL EXAM (OUTPATIENT)
Dept: OBGYN CLINIC | Facility: CLINIC | Age: 31
End: 2021-09-28
Payer: COMMERCIAL

## 2021-09-28 VITALS
SYSTOLIC BLOOD PRESSURE: 138 MMHG | BODY MASS INDEX: 25.66 KG/M2 | HEIGHT: 65 IN | WEIGHT: 154 LBS | DIASTOLIC BLOOD PRESSURE: 80 MMHG

## 2021-09-28 DIAGNOSIS — Z01.419 ROUTINE GYNECOLOGICAL EXAMINATION: Primary | ICD-10-CM

## 2021-09-28 PROCEDURE — S0612 ANNUAL GYNECOLOGICAL EXAMINA: HCPCS | Performed by: OBSTETRICS & GYNECOLOGY

## 2021-10-08 PROBLEM — O35.EXX0 PYELECTASIS OF FETUS ON PRENATAL ULTRASOUND: Status: RESOLVED | Noted: 2021-04-04 | Resolved: 2021-10-08

## 2021-10-08 PROBLEM — O36.63X0 EXCESSIVE FETAL GROWTH AFFECTING MANAGEMENT OF PREGNANCY IN THIRD TRIMESTER: Status: RESOLVED | Noted: 2021-04-15 | Resolved: 2021-10-08

## 2021-10-08 PROBLEM — O35.8XX0 PYELECTASIS OF FETUS ON PRENATAL ULTRASOUND: Status: RESOLVED | Noted: 2021-04-04 | Resolved: 2021-10-08

## 2021-10-08 PROBLEM — Z34.93 NORMAL PREGNANCY, THIRD TRIMESTER: Status: RESOLVED | Noted: 2021-04-04 | Resolved: 2021-10-08

## 2021-10-08 PROBLEM — O36.63X1 LARGE FOR GESTATIONAL AGE FETUS AFFECTING MANAGEMENT OF MOTHER, ANTEPARTUM, THIRD TRIMESTER, FETUS 1: Status: RESOLVED | Noted: 2021-04-17 | Resolved: 2021-10-08

## 2022-08-17 ENCOUNTER — INITIAL PRENATAL (OUTPATIENT)
Dept: OBGYN CLINIC | Facility: CLINIC | Age: 32
End: 2022-08-17

## 2022-08-17 VITALS
SYSTOLIC BLOOD PRESSURE: 120 MMHG | BODY MASS INDEX: 24.29 KG/M2 | DIASTOLIC BLOOD PRESSURE: 70 MMHG | HEIGHT: 65 IN | WEIGHT: 145.8 LBS

## 2022-08-17 DIAGNOSIS — Z36.89 ENCOUNTER FOR OTHER SPECIFIED ANTENATAL SCREENING: Primary | ICD-10-CM

## 2022-08-17 DIAGNOSIS — O34.211 MATERNAL CARE DUE TO LOW TRANSVERSE UTERINE SCAR FROM PREVIOUS CESAREAN DELIVERY: ICD-10-CM

## 2022-08-17 DIAGNOSIS — N91.2 AMENORRHEA: Primary | ICD-10-CM

## 2022-08-17 DIAGNOSIS — O09.299 HISTORY OF MATERNAL FOURTH DEGREE PERINEAL LACERATION, CURRENTLY PREGNANT: ICD-10-CM

## 2022-08-17 DIAGNOSIS — F41.9 ANXIETY DURING PREGNANCY: ICD-10-CM

## 2022-08-17 DIAGNOSIS — Z3A.10 10 WEEKS GESTATION OF PREGNANCY: ICD-10-CM

## 2022-08-17 DIAGNOSIS — O99.340 ANXIETY DURING PREGNANCY: ICD-10-CM

## 2022-08-17 LAB
EXTERNAL CHLAMYDIA SCREEN: NORMAL
EXTERNAL GONORRHEA SCREEN: NORMAL
SL AMB  POCT GLUCOSE, UA: NEGATIVE
SL AMB POCT URINE PROTEIN: NEGATIVE

## 2022-08-17 NOTE — ASSESSMENT & PLAN NOTE
- Reviewed risks of SSRI in pregnancy, including delayed  transition, persistent pulmonary hypertension, and NICU admission  Given stability of symptoms on Lexapro, patient will continue in pregnancy

## 2022-08-17 NOTE — PROGRESS NOTES
OB INTAKE INTERVIEW  Patient is 34 y o  who presents for OB intake at 10 4wks  She is accompanied by: Self  The father of her baby Kassandra Sams) is involved in the pregnancy    Last Menstrual Period: 2022  Ultrasound: Measured 10 weeks 4 days on 2022  Estimated Date of Delivery: 2023 confirmed by ultrasound  Signs/Symptoms of Pregnancy  Current pregnancy symptoms: nausea, tiredness  Constipation no  Headaches no  Cramping/spotting YES (on and off cramping; denies spotting)   PICA cravings no    Diabetes-  Body mass index is 24 45 kg/m²  If patient has 1 or more, please order early 1 hour GTT  History of GDM no  BMI >35 no  History of PCOS or current metformin use no  History of LGA/macrosomic infant (4000g/9lbs) no    If patient has 2 or more, please order early 1 hour GTT  BMI>30 no  AMA no  First degree relative with type 2 diabetes no  History of chronic HTN, hyperlipidemia, elevated A1C no  High risk race (, , ,  or ) no    Hypertension- if you answer yes, please order preeclampsia labs (cbc, comprehensive metabolic panel, urine protein creatinine ratio, uric acid)  History of of chronic HTN no  History of gestational HTN no  History of preeclampsia, eclampsia, or HELLP syndrome no  History of diabetes no  History of lupus, autoimmune disease, kidney disease no    Thyroid- if yes order TSH with reflex T4  History of thyroid disease no    Bleeding Disorder or Hx of DVT-patient or first degree relative with history of  Order the following if not done previously     (Factor V, antithrombin III, prothrombin gene mutation, protein C and S Ag, lupus anticoagulant, anticardiolipin, beta-2 glycoprotein)   no    OB/GYN-  History of abnormal pap smear no  History of HPV no  History of Herpes/HSV no  History of other STI (gonorrhea, chlamydia, trich) YES  History of prior  YES  History of prior  YES  History of  delivery prior to 36 weeks 6 days no  History of blood transfusion no  Ok for blood transfusion -Yes     Substance screening- if yes outside of tobacco for her or anyone in her home-order urine drug screen  History of tobacco use no  Currently using tobacco no  Currently using alcohol no  Presently using drugs no  Past drug use  no  IV drug use-If yes add Hep C antibody to labs no  Partner drug use no  Parent/Family drug use no    MRSA Screening-   Does the pt have a hx of MRSA? no  If yes- please follow MRSA protocol and obtain a nasal swab for MRSA culture    Immunizations:  Influenza vaccine given this season -Yes, aware will be given in Fall 2022  Discussed Tdap vaccine -Yes, aware will be given at 28 wks   Discussed COVID Vaccine -Yes, vaccinated  Genetic/MFM-  Do you or your partner have a history of any of the following in yourselves or first degree relatives? Cystic fibrosis no  Spinal muscular atrophy no  Hemoglobinopathy/Sickle Cell/Thalassemia no  Fragile X Intellectual Disability no    If yes, discuss carrier screening and recommend consultation with MFM/genetic counseling  If no, discuss option for carrier screening and/or genetic testing with Nuchal Ultrasound  Patient interested -Yes   Appointment at [place] [id number] -Patient will call to schedule  Interview education  [facility] Pregnancy Essentials Book reviewed and discussed -Yes      Prenatal lab work scripts -yes  Extra labs ordered:  No    The patient has a history now or in prior pregnancy notable for: 2018 w/ fourth degree laceration  Details that I feel the provider should be aware of: Anxiety; and migraines  The patient was oriented to our practice, reviewed delivering physicians and [employer] for Delivery  All questions were answered      Interviewed by: Zachariah Thomason RN

## 2022-08-17 NOTE — PROGRESS NOTES
[address] [facility], [address], [facility], [address] [facility]    Assessment/Plan:  Gypsy Patient is a 34y o  year old who presents for evaluation of amenorrhea  Amenorrhea  - Single live IUP identified on US today  Estimated Date of Delivery: 3/11/23 based on LMP   - Ultrasound completed, please see Chart Review - Ob Procedures, Ultrasound Report for Interpretation   - Aneuploidy screening discussed  Patient is interested in aneuploidy screening  Referred to [facility] for genetics and early anatomy ultrasound    - Routine cervical cancer screening: Pap Up to date  - Routine STI Screening: GC/Chlamydia sent today  HIV/Hep B/Syphilis ordered in prenatal panel   - Patient Education: Patient was counseled regarding diet, exercise, weight gain, foods to avoid, vaccines in pregnancy, aneuploidy screening, travel precautions to include seat belt use and VTE risk reduction  She has been provided our pregnancy packet which includes how and when to contact providers, medication recommendations, dietary suggestions, breastfeeding information as well as websites for additional information, hospital and delivery concerns  Additional Pregnancy Problems:   1  Amenorrhea  -     AMB US OB < 14 weeks single or first gestation level 1    2  10 weeks gestation of pregnancy    3  Anxiety during pregnancy  Assessment & Plan:  - Reviewed risks of SSRI in pregnancy, including delayed  transition, persistent pulmonary hypertension, and NICU admission  Given stability of symptoms on Lexapro, patient will continue in pregnancy  4  History of maternal fourth degree perineal laceration, currently pregnant    5  Maternal care due to low transverse uterine scar from previous  delivery  Assessment & Plan:  - Plans for repeat  section due to her history of prior 4th degree laceration  Declines TOLAC           Subjective:   CC:  Amenorrhea  Lito Cam is a 34 y o   female who presents for amenorrhea, now with confirmed viable pregnancy  Pregnancy ROS: No leakage of fluid, pelvic pain, or vaginal bleeding  No nausea/vomiting  Past Medical History:   Diagnosis Date    Anxiety     History of Zoloft; currently taking Lexapro 50 mg and managed by PCP    Gonorrhea 2013    History of fourth degree perineal laceration 2018    Requested elective LTCS 2021    Migraines     managed by neuro; history of Imitrex    Papanicolaou smear 2019    neg    Pyelectasis of fetus on prenatal ultrasound 2021    Eden ALVES 2021 10:19:55 EST > Noted at 20 week US  Plan for repeat US at 28 weeks   Anthony Barbosa 2021 11:09:03 EST >  Stable at 4-5mm bilaterally (Same measurements from 20 weeks)  MFM consult: "suggest notification of Pediatrics to ensure  evaluation of the urinary tract dilation occurs at 4-6 weeks of life"     Past Surgical History:   Procedure Laterality Date     SECTION      SKIN LESION EXCISION      birth sara removed    VAGINAL DELIVERY  2018    female     Family History   Problem Relation Age of Onset    Hyperlipidemia Father     No Known Problems Daughter     Thrombosis Maternal Grandmother     Stroke Maternal Grandmother     Osteoporosis Maternal Grandmother     Diabetes Maternal Grandfather     Hyperlipidemia Maternal Grandfather     Hyperlipidemia Paternal Grandmother     Hyperlipidemia Paternal Grandfather     Cancer Paternal Grandfather         intestional    Skin cancer Paternal Grandfather     Lung cancer Paternal Grandfather      Social History     Socioeconomic History    Marital status: /Civil Union     Spouse name: Not on file    Number of children: Not on file    Years of education: Masters degree    Highest education level: Not on file   Occupational History    Occupation: Teacher   Tobacco Use    Smoking status: Never Smoker    Smokeless tobacco: Never Used   Vaping Use    Vaping Use: Never used   Substance and Sexual Activity    Alcohol use: Not Currently     Comment: socially    Drug use: Never     Comment: No use    Sexual activity: Yes     Partners: Male     Birth control/protection: None     Comment: no new partners in past year   Other Topics Concern    Not on file   Social History Narrative    Walking 1-2x per week     Social Determinants of Health     Financial Resource Strain: Not on file   Food Insecurity: Not on file   Transportation Needs: Not on file   Physical Activity: Not on file   Stress: Not on file   Social Connections: Not on file   Intimate Partner Violence: Not on file   Housing Stability: Not on file     No outpatient medications have been marked as taking for the 22 encounter (Initial Prenatal) with Gale Robertson MD      Allergies   Allergen Reactions    Cat Hair Extract Rash             Objective:     LMP 2022 (Exact Date)   Pregravid Weight/BMI: 65 8 kg (145 lb) (BMI 24 31)  Current Weight:     Total Weight Gain: 0 363 kg (12 8 oz)   Pre- Vitals    Flowsheet Row Most Recent Value   Prenatal Assessment    Fetal Heart Rate 176   Fundal Height (cm) 10 cm   Prenatal Vitals    Urine Albumin/Glucose    Dilation/Effacement/Station    Cervical Dilation 0   Cervical Effacement 0   Vaginal Drainage    Draining Fluid No   Edema    LLE Edema None   RLE Edema None   Facial Edema None           Chaperone present? Yes: Onesimo Tomlinson RN  General Appearance: alert and oriented, in no acute distress  Neck/Thyroid: No thyromegaly, no thyroid nodules  Respiratory: Unlabored breathing  Cardiovascular: Regular rate, no peripheral edema  Abdomen: Soft, non-tender, non-distended, no masses, no rebound or guarding  Breast Exam: No dimpling, nipple retraction or discharge  No lumps or masses  No axillary or supraclavicular nodes  Pelvic:       External genitalia: Normal appearance, no abnormal pigmentation, no lesions or masses  Normal Bartholin's and Barwick's  Urinary system: Urethral meatus normal, bladder non-tender  Vaginal: normal mucosa without prolapse or lesions  Normal-appearing physiologic discharge  Cervix: Normal-appearing, well-epithelialized, no gross lesions or masses  No cervical motion tenderness  Adnexa: No adnexal masses or tenderness noted  Uterus: Approximately 10 week-sized, regular contour, midline, mobile, no uterine tenderness  Extremities: Normal range of motion  Warm, well-perfused, non-tender     Skin: normal, no rash or abnormalities  Neurologic: alert, oriented x3  Psychiatric: Appropriate affect, mood stable, cooperative with exam         Amish Weber MD  8/17/2022 3:23 PM

## 2022-08-19 LAB
C TRACH RRNA SPEC QL NAA+PROBE: NEGATIVE
N GONORRHOEA RRNA SPEC QL NAA+PROBE: NEGATIVE

## 2022-09-01 PROBLEM — R73.09 ABNORMAL GLUCOSE TOLERANCE TEST (GTT): Status: RESOLVED | Noted: 2021-04-04 | Resolved: 2022-09-01

## 2022-09-01 NOTE — PATIENT INSTRUCTIONS
Thank you for choosing us for your  care today  If you have any questions about your ultrasound or care, please do not hesitate to contact us or your primary obstetrician  Some general instructions for your pregnancy are:    Protect against coronavirus: get vaccinated - pregnant women are increased risk of severe COVID  Notify your primary care doctor if you have any symptoms  Exercise: Aim for 22 minutes per day (150 minutes per week) of regular exercise  Walking is great! Nutrition: aim for calcium-rich and iron-rich foods as well as healthy sources of protein  Learn about Preeclampsia: preeclampsia is a common, serious high blood pressure complication in pregnancy  A blood pressure of 334VSLP (systolic or top number) or 54QRAN (diastolic or bottom number) is not normal and needs evaluation by your doctor  Aspirin is sometimes prescribed in early pregnancy to prevent preeclampsia in women with risk factors - ask your obstetrician if you should be on this medication  If you smoke, try to reduce how many cigarettes you smoke or try to quit completely  Do not vape  Other warning signs to watch out for in pregnancy or postpartum: chest pain, obstructed breathing or shortness of breath, seizures, thoughts of hurting yourself or your baby, bleeding, a painful or swollen leg, fever, or headache (see AWHONN POST-BIRTH Warning Signs campaign)  If these happen call 911  Itching is also not normal in pregnancy and if you experience this, especially over your hands and feet, potentially worse at night, notify your doctors

## 2022-09-02 ENCOUNTER — ROUTINE PRENATAL (OUTPATIENT)
Dept: PERINATAL CARE | Facility: CLINIC | Age: 32
End: 2022-09-02
Payer: COMMERCIAL

## 2022-09-02 VITALS
HEIGHT: 65 IN | BODY MASS INDEX: 24.86 KG/M2 | HEART RATE: 92 BPM | WEIGHT: 149.2 LBS | SYSTOLIC BLOOD PRESSURE: 120 MMHG | DIASTOLIC BLOOD PRESSURE: 65 MMHG

## 2022-09-02 DIAGNOSIS — Z36.82 ENCOUNTER FOR (NT) NUCHAL TRANSLUCENCY SCAN: ICD-10-CM

## 2022-09-02 DIAGNOSIS — Z3A.12 12 WEEKS GESTATION OF PREGNANCY: ICD-10-CM

## 2022-09-02 DIAGNOSIS — O09.299 HISTORY OF MATERNAL FOURTH DEGREE PERINEAL LACERATION, CURRENTLY PREGNANT: ICD-10-CM

## 2022-09-02 DIAGNOSIS — O34.211 MATERNAL CARE DUE TO LOW TRANSVERSE UTERINE SCAR FROM PREVIOUS CESAREAN DELIVERY: Primary | ICD-10-CM

## 2022-09-02 DIAGNOSIS — Z36.89 ENCOUNTER FOR OTHER SPECIFIED ANTENATAL SCREENING: ICD-10-CM

## 2022-09-02 PROCEDURE — 76813 OB US NUCHAL MEAS 1 GEST: CPT | Performed by: OBSTETRICS & GYNECOLOGY

## 2022-09-02 PROCEDURE — 99203 OFFICE O/P NEW LOW 30 MIN: CPT | Performed by: OBSTETRICS & GYNECOLOGY

## 2022-09-02 PROCEDURE — 36415 COLL VENOUS BLD VENIPUNCTURE: CPT | Performed by: OBSTETRICS & GYNECOLOGY

## 2022-09-02 PROCEDURE — 76801 OB US < 14 WKS SINGLE FETUS: CPT | Performed by: OBSTETRICS & GYNECOLOGY

## 2022-09-02 NOTE — PROGRESS NOTES
Patient chose to have [facility] Non-invasive Prenatal Screen  Patient given brochure and is aware [facility] will contact patients insurance and coordinate coverage  Patient made aware she will need to respond to text message or e-mail from [employer] within 2 business days or testing will be run through insurance  Patient informed text message will come from area code  "415"  Provided [employer] # [phone] and web site : [email or web address]     2 vials of blood drawn from left arm, patient tolerated blood draw without difficulty  Specimens labeled with patient identifiers (name, date of birth, specimen collection date), order and specimen was verified with patient, packed and sent via [employer] [employer]  Copy of lab order scanned to Epic media  Maternal Fetal Medicine will have results in approximately 7-10 business days and will call patient or notify via [address]  Patient aware viewing lab result online will reveal fetal sex If ordered  Patient verbalized understanding of all instructions and no questions at this time

## 2022-09-02 NOTE — PROGRESS NOTES
[address]: Ms Eloisa Jane was seen today for nuchal translucency ultrasound  See ultrasound report under "OB Procedures" tab  Review of Systems   Constitutional: Negative for chills, fever and unexpected weight change  HENT: Negative for congestion, dental problem, facial swelling and sore throat  Eyes: Negative for visual disturbance  Respiratory: Negative for cough and shortness of breath  Cardiovascular: Negative for chest pain and palpitations  Gastrointestinal: Negative for diarrhea and vomiting  Endocrine: Negative for polydipsia  Genitourinary: Negative for dysuria and vaginal bleeding  Musculoskeletal: Negative for back pain and joint swelling  Skin: Negative for rash and wound  Allergic/Immunologic: Negative for immunocompromised state  Neurological: Negative for seizures and headaches  Hematological: Does not bruise/bleed easily  Psychiatric/Behavioral: Negative for hallucinations and suicidal ideas  Physical Exam  Constitutional:       General: She is not in acute distress  Appearance: Normal appearance  She is not ill-appearing, toxic-appearing or diaphoretic  HENT:      Head: Normocephalic and atraumatic  Nose: No congestion or rhinorrhea  Eyes:      General: No scleral icterus  Right eye: No discharge  Left eye: No discharge  Extraocular Movements: Extraocular movements intact  Conjunctiva/sclera: Conjunctivae normal    Pulmonary:      Effort: Pulmonary effort is normal  No respiratory distress  Musculoskeletal:      Cervical back: Normal range of motion  Skin:     Coloration: Skin is not jaundiced or pale  Findings: No erythema, lesion or rash  Neurological:      General: No focal deficit present  Mental Status: She is alert and oriented to person, place, and time     Psychiatric:         Mood and Affect: Mood normal          Behavior: Behavior normal          Please don't hesitate to contact our office with any concerns or questions    Jocelyne Hopkins MD

## 2022-09-02 NOTE — LETTER
2022    MD Jaci Wyatt 81  [address] [address]    Patient: Jessie Peralta   YOB: 1990   Date of Visit: 2022   Gestational age 14w9d   Erika Sutton of this communication: Routine       Dear Yousif Houston,    This patient was seen recently in our  office  The content of my evaluation today is in the ultrasound report under "OB Procedures" tab  Please don't hesitate to contact our office with any concerns or questions       Sincerely,      Gunnar Thomas MD  Attending Physician, [place]

## 2022-09-12 ENCOUNTER — TELEPHONE (OUTPATIENT)
Dept: PERINATAL CARE | Facility: CLINIC | Age: 32
End: 2022-09-12

## 2022-09-12 NOTE — TELEPHONE ENCOUNTER
Left VMM for patient with results of her [facility] test, gender not provided  Patient instructed on MSAFP being ordered by OB and timing of test  Patient receptive to information and declines further questions at this time / Patient instructed to contact the nurse line with any questions

## 2022-09-12 NOTE — TELEPHONE ENCOUNTER
----- Message from Grace Galeas MD sent at 9/10/2022  6:38 AM EDT -----  Hi  RN staff, I've reviewed this NIPS result which shows low residual risk for the conditions tested (trisomies 13, 18, and 21, and sex chromosome abnormality), can you call her to inform her of this result if she has not viewed her result via [employer]? Her OB office is to order the MSAFP  and I sent her a [employer] message as an FYI  Thank you    Grace Galeas MD

## 2022-09-13 ENCOUNTER — ROUTINE PRENATAL (OUTPATIENT)
Dept: OBGYN CLINIC | Facility: CLINIC | Age: 32
End: 2022-09-13

## 2022-09-13 VITALS — BODY MASS INDEX: 25.59 KG/M2 | SYSTOLIC BLOOD PRESSURE: 110 MMHG | DIASTOLIC BLOOD PRESSURE: 58 MMHG | WEIGHT: 151.4 LBS

## 2022-09-13 DIAGNOSIS — O09.299 HISTORY OF MATERNAL FOURTH DEGREE PERINEAL LACERATION, CURRENTLY PREGNANT: ICD-10-CM

## 2022-09-13 DIAGNOSIS — Z36.1 ANTENATAL SCREENING FOR RAISED ALPHAFETOPROTEIN LEVEL: ICD-10-CM

## 2022-09-13 DIAGNOSIS — Z3A.14 14 WEEKS GESTATION OF PREGNANCY: ICD-10-CM

## 2022-09-13 DIAGNOSIS — O34.211 MATERNAL CARE DUE TO LOW TRANSVERSE UTERINE SCAR FROM PREVIOUS CESAREAN DELIVERY: Primary | ICD-10-CM

## 2022-09-13 LAB
SL AMB  POCT GLUCOSE, UA: NEGATIVE
SL AMB POCT URINE PROTEIN: NEGATIVE

## 2022-09-26 LAB
EXTERNAL ABO GROUPING: NORMAL
EXTERNAL ANTIBODY SCREEN: NORMAL
EXTERNAL HEMOGLOBIN: 12.5 G/DL
EXTERNAL HEPATITIS B SURFACE ANTIGEN: NORMAL
EXTERNAL HIV-1/2 AB-AG: NORMAL
EXTERNAL PLATELET COUNT: 173 K/ÂΜL
EXTERNAL RH FACTOR: POSITIVE
EXTERNAL RUBELLA IGG QUANTITATION: 3.65
EXTERNAL SYPHILIS RPR SCREEN: NORMAL

## 2022-09-27 PROBLEM — Z3A.14 14 WEEKS GESTATION OF PREGNANCY: Status: ACTIVE | Noted: 2022-08-17

## 2022-09-27 LAB
ABO GROUP BLD: ABNORMAL
APPEARANCE UR: CLEAR
BACTERIA UR CULT: NORMAL
BACTERIA URNS QL MICRO: ABNORMAL
BASOPHILS # BLD AUTO: 0 X10E3/UL (ref 0–0.2)
BASOPHILS NFR BLD AUTO: 0 %
BILIRUB UR QL STRIP: NEGATIVE
BLD GP AB SCN SERPL QL: NEGATIVE
CASTS URNS QL MICRO: ABNORMAL /LPF
COLOR UR: YELLOW
EOSINOPHIL # BLD AUTO: 0.3 X10E3/UL (ref 0–0.4)
EOSINOPHIL NFR BLD AUTO: 3 %
EPI CELLS #/AREA URNS HPF: >10 /HPF (ref 0–10)
ERYTHROCYTE [DISTWIDTH] IN BLOOD BY AUTOMATED COUNT: 14 % (ref 11.7–15.4)
GLUCOSE UR QL: NEGATIVE
HBV SURFACE AG SERPL QL IA: NEGATIVE
HCT VFR BLD AUTO: 35.4 % (ref 34–46.6)
HCV AB S/CO SERPL IA: <0.1 S/CO RATIO (ref 0–0.9)
HGB BLD-MCNC: 12.5 G/DL (ref 11.1–15.9)
HGB UR QL STRIP: NEGATIVE
HIV 1+2 AB+HIV1 P24 AG SERPL QL IA: NON REACTIVE
IMM GRANULOCYTES # BLD: 0.3 X10E3/UL (ref 0–0.1)
IMM GRANULOCYTES NFR BLD: 3 %
KETONES UR QL STRIP: NEGATIVE
LEUKOCYTE ESTERASE UR QL STRIP: ABNORMAL
LYMPHOCYTES # BLD AUTO: 1.3 X10E3/UL (ref 0.7–3.1)
LYMPHOCYTES NFR BLD AUTO: 14 %
Lab: NORMAL
MCH RBC QN AUTO: 30.9 PG (ref 26.6–33)
MCHC RBC AUTO-ENTMCNC: 35.3 G/DL (ref 31.5–35.7)
MCV RBC AUTO: 87 FL (ref 79–97)
MICRO URNS: ABNORMAL
MONOCYTES # BLD AUTO: 0.5 X10E3/UL (ref 0.1–0.9)
MONOCYTES NFR BLD AUTO: 5 %
NEUTROPHILS # BLD AUTO: 7 X10E3/UL (ref 1.4–7)
NEUTROPHILS NFR BLD AUTO: 75 %
NITRITE UR QL STRIP: NEGATIVE
PH UR STRIP: 5.5 [PH] (ref 5–7.5)
PLATELET # BLD AUTO: 173 X10E3/UL (ref 150–450)
PROT UR QL STRIP: NEGATIVE
RBC # BLD AUTO: 4.05 X10E6/UL (ref 3.77–5.28)
RBC #/AREA URNS HPF: ABNORMAL /HPF (ref 0–2)
RH BLD: POSITIVE
RPR SER QL: NON REACTIVE
RUBV IGG SERPL IA-ACNC: 3.65 INDEX
SP GR UR: 1.02 (ref 1–1.03)
UROBILINOGEN UR STRIP-ACNC: 0.2 MG/DL (ref 0.2–1)
WBC # BLD AUTO: 9.4 X10E3/UL (ref 3.4–10.8)
WBC #/AREA URNS HPF: ABNORMAL /HPF (ref 0–5)

## 2022-09-27 NOTE — PROGRESS NOTES
Routine Prenatal Visit  [address], [address], [facility], [address]    Assessment/Plan:  Eden Loomis is a 34y o  year old  at 14w3d who presents for routine prenatal visit  1  Maternal care due to low transverse uterine scar from previous  delivery    2  History of maternal fourth degree perineal laceration, currently pregnant    3  14 weeks gestation of pregnancy  Assessment & Plan:  Negative NIPS  Has not gone for PN bloodwork yet and she was encourage to go soon  MSAFP ordered    Orders:  -     POCT urine dip    4   screening for raised alphafetoprotein level  -     Alpha fetoprotein, maternal; Future; Expected date: 10/27/2022  -     Alpha fetoprotein, maternal        Subjective:     CC: Prenatal care    Lisandro Carranza is a 34 y o  Curtis Leyden female who presents for routine prenatal care at 14w3d  Pregnancy ROS: no leakage of fluid, pelvic pain, or vaginal bleeding  normal fetal movement  The following portions of the patient's history were reviewed and updated as appropriate: allergies, current medications, past family history, past medical history, obstetric history, gynecologic history, past social history, past surgical history and problem list       Objective:  /58   Wt 68 7 kg (151 lb 6 4 oz)   LMP 2022 (Exact Date)   BMI 25 59 kg/m²   Pregravid Weight/BMI: 65 8 kg (145 lb) (BMI 24 51)  Current Weight: 68 7 kg (151 lb 6 4 oz)   Total Weight Gain: 2 903 kg (6 lb 6 4 oz)   Pre- Vitals    Flowsheet Row Most Recent Value   Prenatal Assessment    Fetal Heart Rate 150   Fundal Height (cm) 15 cm   Movement Absent   Prenatal Vitals    Blood Pressure 110/58   Weight - Scale 68 7 kg (151 lb 6 4 oz)   Urine Albumin/Glucose    Dilation/Effacement/Station    Vaginal Drainage    Edema            General: Well appearing, no distress  Respiratory: Unlabored breathing  Cardiovascular: Regular rate    Abdomen: Soft, gravid, nontender  Fundal Height: Appropriate for gestational age  Extremities: Warm and well perfused  Non tender

## 2022-09-29 ENCOUNTER — TELEPHONE (OUTPATIENT)
Dept: OBGYN CLINIC | Facility: CLINIC | Age: 32
End: 2022-09-29

## 2022-09-29 NOTE — TELEPHONE ENCOUNTER
Patient called, she is almost 17 weeks pregnant and was walking down the steps carrying her son and slipped hitting her back and buttocks and going down about 4-5 steps  She states did not hit her belly or her head  She denies being hurt, and her son if fine  "More scared, than anything else "  She denies any pelvic or abdominal pain or bleeding since the fall  She hasn't felt the baby move yet  I reassured her there is a lot of fluid and cushion around the baby at this stage and it's unlikely the baby was hurt  At this [facility] we don't do any kind of testing after a fall, but of course if she develops pain or bleeding she should come in for evaluation  I offered that we can try to see her in the office tomorrow to check the baby's heart tones for reassurance  She would like that  Blood type is O +  I will have staff call her tomorrow and see if we can bring her in for [address] [facility] in office tomorrow

## 2022-09-30 ENCOUNTER — OFFICE VISIT (OUTPATIENT)
Dept: OBGYN CLINIC | Facility: CLINIC | Age: 32
End: 2022-09-30

## 2022-09-30 VITALS
DIASTOLIC BLOOD PRESSURE: 72 MMHG | WEIGHT: 155 LBS | BODY MASS INDEX: 25.83 KG/M2 | SYSTOLIC BLOOD PRESSURE: 112 MMHG | HEIGHT: 65 IN

## 2022-09-30 DIAGNOSIS — O34.211 MATERNAL CARE DUE TO LOW TRANSVERSE UTERINE SCAR FROM PREVIOUS CESAREAN DELIVERY: ICD-10-CM

## 2022-09-30 DIAGNOSIS — O09.299 HISTORY OF MATERNAL FOURTH DEGREE PERINEAL LACERATION, CURRENTLY PREGNANT: ICD-10-CM

## 2022-09-30 DIAGNOSIS — F41.9 ANXIETY DURING PREGNANCY: ICD-10-CM

## 2022-09-30 DIAGNOSIS — Z3A.16 16 WEEKS GESTATION OF PREGNANCY: Primary | ICD-10-CM

## 2022-09-30 DIAGNOSIS — O99.340 ANXIETY DURING PREGNANCY: ICD-10-CM

## 2022-09-30 NOTE — ASSESSMENT & PLAN NOTE
- Prenatal lab results reviewed  - Aneuploidy/ONTD screening reviewed  Patient completed NIPS, the results of which were low risk  Order for msAFP was placed after last visit-- requisition printed and provided to patient today  - First trimester MFM consult report reviewed  Level II ultrasound is scheduled  - Problem list updated, results console reviewed and updated with pertinent prenatal labs  - PMH, PSH, medications reviewed and updated as needed  - Return for routine prenatal care  Next appointment is scheduled for 10/10

## 2022-09-30 NOTE — PROGRESS NOTES
Routine Prenatal Visit  [address] [facility], [address], [facility], [address] [facility]    Assessment/Plan:  Charles Alvarez is a 34y o  year old  at [place] [address] who presents for fetal evaluation after falling on her buttocks yesterday/       16 weeks gestation of pregnancy  Assessment & Plan:  - Prenatal lab results reviewed  - Aneuploidy/ONTD screening reviewed  Patient completed NIPS, the results of which were low risk  Order for msAFP was placed after last visit-- requisition printed and provided to patient today  - First trimester MFM consult report reviewed  Level II ultrasound is scheduled  - Problem list updated, results console reviewed and updated with pertinent prenatal labs  - PMH, PSH, medications reviewed and updated as needed  - Return for routine prenatal care  Next appointment is scheduled for 10/10          2  Anxiety during pregnancy    3  Maternal care due to low transverse uterine scar from previous  delivery    4  History of maternal fourth degree perineal laceration, currently pregnant        Subjective:   Rosita Merida is a 34 y o  [id number] who presents for routine prenatal care at [place] [address]  Complaints today: Fall yesterday  Presents for FHR evaluation  LOF: No; VB: No; Contractions: No; FM: not yet    Objective:  /72 (BP Location: Left arm, Patient Position: Sitting, Cuff Size: Standard)   Ht 5' 4 5" (1 638 m)   Wt 70 3 kg (155 lb)   LMP 2022 (Exact Date)   BMI 26 19 kg/m²     General: Well appearing, no distress  Respiratory: Unlabored breathing  Cardiovascular: Regular rate  Abdomen: Soft, gravid, nontender  Extremities: Warm and well perfused  Non tender  Pregravid Weight/BMI: 65 8 kg (145 lb) (BMI 24 51)  Current Weight: 70 3 kg (155 lb)   Total Weight Gain: 2 903 kg (6 lb 6 4 oz)      bpm by doptone today  Patient reassured  Call if cramping, LOF, or VB       Pre- Vitals    Flowsheet Row Most Recent Value   Prenatal Assessment Prenatal Vitals    Blood Pressure 112/72   Weight - Scale 70 3 kg (155 lb)   Urine Albumin/Glucose    Dilation/Effacement/Station    Vaginal Drainage    Edema            Rana Care  9/30/2022 1:22 PM

## 2022-10-08 LAB
2ND TRIMESTER 4 SCREEN SERPL-IMP: NORMAL
AFP ADJ MOM SERPL: 0.57
AFP INTERP AMN-IMP: NORMAL
AFP INTERP SERPL-IMP: NORMAL
AFP INTERP SERPL-IMP: NORMAL
AFP SERPL-MCNC: 24.7 NG/ML
AGE AT DELIVERY: 33.5 YR
GA METHOD: NORMAL
GA: 17.6 WEEKS
IDDM PATIENT QL: NO
MULTIPLE PREGNANCY: NO
NEURAL TUBE DEFECT RISK FETUS: NORMAL %

## 2022-10-10 ENCOUNTER — ROUTINE PRENATAL (OUTPATIENT)
Dept: OBGYN CLINIC | Facility: CLINIC | Age: 32
End: 2022-10-10

## 2022-10-10 VITALS
BODY MASS INDEX: 26.33 KG/M2 | HEIGHT: 65 IN | WEIGHT: 158 LBS | DIASTOLIC BLOOD PRESSURE: 70 MMHG | SYSTOLIC BLOOD PRESSURE: 118 MMHG

## 2022-10-10 DIAGNOSIS — F41.9 ANXIETY DURING PREGNANCY: ICD-10-CM

## 2022-10-10 DIAGNOSIS — O09.299 HISTORY OF MATERNAL FOURTH DEGREE PERINEAL LACERATION, CURRENTLY PREGNANT: ICD-10-CM

## 2022-10-10 DIAGNOSIS — Z87.59 HISTORY OF MIGRAINE DURING PREGNANCY: ICD-10-CM

## 2022-10-10 DIAGNOSIS — Z3A.18 18 WEEKS GESTATION OF PREGNANCY: Primary | ICD-10-CM

## 2022-10-10 DIAGNOSIS — Z86.69 HISTORY OF MIGRAINE DURING PREGNANCY: ICD-10-CM

## 2022-10-10 DIAGNOSIS — O34.211 MATERNAL CARE DUE TO LOW TRANSVERSE UTERINE SCAR FROM PREVIOUS CESAREAN DELIVERY: ICD-10-CM

## 2022-10-10 DIAGNOSIS — O99.340 ANXIETY DURING PREGNANCY: ICD-10-CM

## 2022-10-10 LAB
SL AMB  POCT GLUCOSE, UA: NEGATIVE
SL AMB POCT URINE PROTEIN: NEGATIVE

## 2022-10-10 NOTE — PATIENT INSTRUCTIONS
NUTRITION IN PREGNANCY  Good Nutrition is a VERY important part of having a healthy pregnancy and healthy baby  You should follow a healthy diet which include the following: * Vegetables (which are dark green and leafy): at least 2 servings each day   * Protein (meat, eggs, beans, nuts, peanut butter): 3-4 servings each day   * Breads/whole grains (bread, pasta, rice, tortillas, potatoes): 3 servings each day   * Dairy (milk, yogurt, cheese): 3-4 servings each day   * Water: 6-8 glasses per day   * Calories: approximately 2000 to 2200 calories per day     WEIGHT GAIN   Recommended weight gain for you during your pregnancy is based on your body mass index (BMI) at the time that you became pregnant  Pre-pregnant BMI Recommended weight gain   Underweight (BMI less than 18 5) 28 to 40 pounds   Normal weight (BMI 18 5-24 9) 25 to 35 pounds   Overweight (BMI 25-29 9) 15 to 25 pounds   Obese (BMI 30 or greater) 11 to 20 pounds     FOOD SAFETY   It is VERY important to eat only safely-prepared foods during pregnancy as you and your baby have a higher risk than usual for being affected by foodborne illnesses    Follow these steps to ensure that you and your baby are safe from foodborne illnesses while you are pregnant:   wash hands thoroughly with warm water and soap before and after handling any foods   wash cutting boards, dishes, utensils, and countertops with hot water and soap before and after preparing any foods   rinse raw fruits and vegetables thoroughly under running water before eating   keep raw meat and seafood separate from other foods and use different cutting boards/utensils to handle raw meat than for other foods   put cooked food on a freshly clean plate   cook all of your foods thoroughly   discard foods that have been left out for more than 2 hours   refrigerate or freeze any foods than can spoil     There are three particular foodborne risks that you should be aware of and avoid as they can cause serious harm to your unborn child  * Listeria (a harmful bacteria)   don’t eat hot dogs or deli meats (unless they’re reheated until steaming hot)   don’t eat soft cheeses (such as Feta, Brie, Camembert) unless they are specifically labeled as being “made with pasteurized milk”   don’t drink raw (unpasteurized) milk   don’t eat refrigerated pates or meat spreads   don’t eat refrigerated smoked seafood unless it’s in a cooked dish like a casserole     * Mercury (a metal which is found in certain fish in high levels)   don’t eat shark, tilefish, ifrah mackerel, or swordfish   don’t eat more than 12 ounces per week of shrimp, salmon, pollock, or catfish   when eating tuna fish, you can have up to 6 ounces per week of canned albacore tuna OR up to 12 ounces of canned light tuna     * Toxoplasma (a harmful parasite)   cook meat thoroughly before eating   wear gloves when gardening or handling sand from a child’s sandbox   if you ha tve a cat, have someone else change the litter box while you are pregnant      if you HAVE to clean it yourself, be sure to wash your hands thoroughly afterwards with warm water and soap    don’t get a NEW cat while you are pregnant

## 2022-10-10 NOTE — PROGRESS NOTES
Routine Prenatal Visit  [address] [facility], [address], [facility], [address] [facility]    Assessment/Plan:  Gonzalo Rinaldi is a 34y o  year old  at 18w2d who presents for routine prenatal visit  1  18 weeks gestation of pregnancy  -     POCT urine dip    2  History of maternal fourth degree perineal laceration, currently pregnant    3  Anxiety during pregnancy    4  Maternal care due to low transverse uterine scar from previous  delivery    5  History of migraine during pregnancy    + fm  No bleeding  + sore  Coccyx bone after a fall down the steps  No  Bleeding Labs reviewed       Subjective:     CC: Prenatal care    Daniel Barrett is a 34 y o   female who presents for routine prenatal care at 18w2d  Pregnancy ROS: no  leakage of fluid, pelvic pain, or vaginal bleeding   + fetal movement  The following portions of the patient's history were reviewed and updated as appropriate: allergies, current medications, past family history, past medical history, obstetric history, gynecologic history, past social history, past surgical history and problem list       Objective:  /70 (BP Location: Left arm, Patient Position: Sitting, Cuff Size: Standard)   Ht 5' 4 5" (1 638 m)   Wt 71 7 kg (158 lb)   LMP 2022 (Exact Date)   BMI 26 70 kg/m²   Pregravid Weight/BMI: 65 8 kg (145 lb) (BMI 24 51)  Current Weight: 71 7 kg (158 lb)   Total Weight Gain: 5 897 kg (13 lb)   Pre-Linnea Vitals    Flowsheet Row Most Recent Value   Prenatal Assessment    Prenatal Vitals    Blood Pressure 118/70   Weight - Scale 71 7 kg (158 lb)   Urine Albumin/Glucose    Dilation/Effacement/Station    Vaginal Drainage    Edema            General: Well appearing, no distress  Respiratory: Unlabored breathing  Cardiovascular: Regular rate  Abdomen: Soft, gravid, nontender  Fundal Height: Appropriate for gestational age  Extremities: Warm and well perfused  Non tender

## 2022-10-24 ENCOUNTER — ROUTINE PRENATAL (OUTPATIENT)
Dept: PERINATAL CARE | Facility: OTHER | Age: 32
End: 2022-10-24
Payer: COMMERCIAL

## 2022-10-24 VITALS
DIASTOLIC BLOOD PRESSURE: 66 MMHG | SYSTOLIC BLOOD PRESSURE: 104 MMHG | HEART RATE: 85 BPM | HEIGHT: 65 IN | BODY MASS INDEX: 26.39 KG/M2 | WEIGHT: 158.4 LBS

## 2022-10-24 DIAGNOSIS — Z36.86 ENCOUNTER FOR ANTENATAL SCREENING FOR CERVICAL LENGTH: ICD-10-CM

## 2022-10-24 DIAGNOSIS — Z36.3 ENCOUNTER FOR ANTENATAL SCREENING FOR MALFORMATIONS: Primary | ICD-10-CM

## 2022-10-24 DIAGNOSIS — O34.219 HISTORY OF CESAREAN DELIVERY, ANTEPARTUM: ICD-10-CM

## 2022-10-24 PROCEDURE — 76805 OB US >/= 14 WKS SNGL FETUS: CPT | Performed by: OBSTETRICS & GYNECOLOGY

## 2022-10-24 PROCEDURE — 76817 TRANSVAGINAL US OBSTETRIC: CPT | Performed by: OBSTETRICS & GYNECOLOGY

## 2022-10-24 NOTE — PATIENT INSTRUCTIONS
Thank you for choosing us for your  care today  If you have any questions about your ultrasound or care, please do not hesitate to contact us or your primary obstetrician  Some general instructions for your pregnancy are:    Protect against coronavirus: get vaccinated - pregnant women are increased risk of severe COVID  Notify your primary care doctor if you have any symptoms  Exercise: Aim for 22 minutes per day (150 minutes per week) of regular exercise  Walking is great! Nutrition: aim for calcium-rich and iron-rich foods as well as healthy sources of protein  Learn about Preeclampsia: preeclampsia is a common, serious high blood pressure complication in pregnancy  A blood pressure of 043AVQQ (systolic or top number) or 74XEMA (diastolic or bottom number) is not normal and needs evaluation by your doctor  Aspirin is sometimes prescribed in early pregnancy to prevent preeclampsia in women with risk factors - ask your obstetrician if you should be on this medication  If you smoke, try to reduce how many cigarettes you smoke or try to quit completely  Do not vape  Other warning signs to watch out for in pregnancy or postpartum: chest pain, obstructed breathing or shortness of breath, seizures, thoughts of hurting yourself or your baby, bleeding, a painful or swollen leg, fever, or headache (see AWHONN POST-BIRTH Warning Signs campaign)  If these happen call 911  Itching is also not normal in pregnancy and if you experience this, especially over your hands and feet, potentially worse at night, notify your doctors

## 2022-10-24 NOTE — PROGRESS NOTES
Ultrasound Probe Disinfection    A transvaginal ultrasound was performed  Prior to use, disinfection was performed with High Level Disinfection Process (Trophon)  Probe serial number [id number] was used        Neil Gutierrez  10/24/22  4:25 PM

## 2022-10-25 NOTE — PROGRESS NOTES
[id number] [facility]: Ms Sara Jimenez was seen today for anatomic survey and cervical length screening ultrasound  See ultrasound report under "OB Procedures" tab  The time spent on this established patient on the encounter date included 5 minutes previsit service time reviewing records and precharting, 10 minutes face-to-face service time counseling regarding results and coordinating care, and  5 minutes charting, totalling 20 minutes  Please don't hesitate to contact our office with any concerns or questions    Adamaris Almaguer

## 2022-11-09 ENCOUNTER — ROUTINE PRENATAL (OUTPATIENT)
Dept: OBGYN CLINIC | Facility: CLINIC | Age: 32
End: 2022-11-09

## 2022-11-09 VITALS
DIASTOLIC BLOOD PRESSURE: 64 MMHG | WEIGHT: 162 LBS | SYSTOLIC BLOOD PRESSURE: 102 MMHG | HEIGHT: 65 IN | BODY MASS INDEX: 26.99 KG/M2

## 2022-11-09 DIAGNOSIS — Z34.92 PRENATAL CARE IN SECOND TRIMESTER: ICD-10-CM

## 2022-11-09 DIAGNOSIS — F41.9 ANXIETY DURING PREGNANCY: ICD-10-CM

## 2022-11-09 DIAGNOSIS — O34.211 MATERNAL CARE DUE TO LOW TRANSVERSE UTERINE SCAR FROM PREVIOUS CESAREAN DELIVERY: Primary | ICD-10-CM

## 2022-11-09 DIAGNOSIS — O99.340 ANXIETY DURING PREGNANCY: ICD-10-CM

## 2022-11-09 DIAGNOSIS — O09.299 HISTORY OF MATERNAL FOURTH DEGREE PERINEAL LACERATION, CURRENTLY PREGNANT: ICD-10-CM

## 2022-11-09 DIAGNOSIS — N89.8 VAGINAL WALL CYST: ICD-10-CM

## 2022-11-10 NOTE — PROGRESS NOTES
Routine Prenatal Visit  [address]   [address], [address], [facility], [facility] [address]    Assessment/Plan:  Domenica Cruz is a 34y o  year old  at 22w5d who presents for routine prenatal visit  1  Maternal care due to low transverse uterine scar from previous  delivery    2  History of maternal fourth degree perineal laceration, currently pregnant    3  Anxiety during pregnancy    4  Prenatal care in second trimester    5  Vaginal wall cyst       -  3 cm non tender cyst noted on left vaginal wall superior to introitus  Cysts drained via <1cm incision  Clear fluid drained, minimal bleeding noted at the site of incision  Silver nitrate applied to obtain excellent hemostasis  Patient tolerated the procedure well  Advised patient to inform Ob/Gyn if bleeding or tenderness occurs at site of I&D    Next OB Visit 4 weeks  Subjective:     CC: Prenatal care    Monie Edwards is a 34 y o   female who presents for routine prenatal care at 22w5d  Patient presents with concern that her vaginal cyst is enlarging and patient has discomfort in vagina  Patient states she was informed at previous Ob visits that the cyst could be drained in office  Patient denies having any vaginal bleeding or fever/chills    Pregnancy ROS: no leakage of fluid, pelvic pain, or vaginal bleeding  normal fetal movement      The following portions of the patient's history were reviewed and updated as appropriate: allergies, current medications, past family history, past medical history, obstetric history, gynecologic history, past social history, past surgical history and problem list       Objective:  /64   Ht 5' 5" (1 651 m)   Wt 73 5 kg (162 lb)   LMP 2022 (Exact Date)   BMI 26 96 kg/m²   Pregravid Weight/BMI: 65 8 kg (145 lb) (BMI 24 13)  Current Weight: 73 5 kg (162 lb)   Total Weight Gain: 7 711 kg (17 lb)   Pre-Linnea Vitals    Flowsheet Row Most Recent Value   Prenatal Assessment Fetal Heart Rate 150   Fundal Height (cm) 23 cm   Movement Present   Prenatal Vitals    Blood Pressure 102/64   Weight - Scale 73 5 kg (162 lb)   Urine Albumin/Glucose    Dilation/Effacement/Station    Vaginal Drainage    Draining Fluid No   Edema    LLE Edema Trace   RLE Edema Trace   Facial Edema Unable to assess           General: Well appearing, no distress  Abdomen: Soft, gravid, nontender  Fundal Height: Appropriate for gestational age  Extremities: Warm and well perfused  Non tender

## 2022-12-08 ENCOUNTER — ROUTINE PRENATAL (OUTPATIENT)
Dept: OBGYN CLINIC | Facility: CLINIC | Age: 32
End: 2022-12-08

## 2022-12-08 VITALS
DIASTOLIC BLOOD PRESSURE: 58 MMHG | WEIGHT: 165.6 LBS | HEIGHT: 65 IN | BODY MASS INDEX: 27.59 KG/M2 | SYSTOLIC BLOOD PRESSURE: 100 MMHG

## 2022-12-08 DIAGNOSIS — O34.211 MATERNAL CARE DUE TO LOW TRANSVERSE UTERINE SCAR FROM PREVIOUS CESAREAN DELIVERY: ICD-10-CM

## 2022-12-08 DIAGNOSIS — O09.299 HISTORY OF MATERNAL FOURTH DEGREE PERINEAL LACERATION, CURRENTLY PREGNANT: ICD-10-CM

## 2022-12-08 DIAGNOSIS — Z3A.26 26 WEEKS GESTATION OF PREGNANCY: Primary | ICD-10-CM

## 2022-12-08 DIAGNOSIS — F41.9 ANXIETY DURING PREGNANCY: ICD-10-CM

## 2022-12-08 DIAGNOSIS — O99.340 ANXIETY DURING PREGNANCY: ICD-10-CM

## 2022-12-08 DIAGNOSIS — Z86.69 HISTORY OF MIGRAINE DURING PREGNANCY: ICD-10-CM

## 2022-12-08 DIAGNOSIS — Z87.59 HISTORY OF MIGRAINE DURING PREGNANCY: ICD-10-CM

## 2022-12-08 DIAGNOSIS — Z34.92 PRENATAL CARE IN SECOND TRIMESTER: ICD-10-CM

## 2022-12-08 DIAGNOSIS — Z36.89 ENCOUNTER FOR OTHER SPECIFIED ANTENATAL SCREENING: ICD-10-CM

## 2022-12-08 LAB
SL AMB  POCT GLUCOSE, UA: NEGATIVE
SL AMB POCT URINE PROTEIN: NORMAL

## 2022-12-08 NOTE — PROGRESS NOTES
Routine Prenatal Visit  [address]   [address], [address], [facility], [facility] 1    Assessment/Plan:  She Yang is a 34y o  year old  at 26w5d who presents for routine prenatal visit  1  26 weeks gestation of pregnancy  -     POCT urine dip    2  Maternal care due to low transverse uterine scar from previous  delivery    3  History of maternal fourth degree perineal laceration, currently pregnant    4  Anxiety during pregnancy    5  Encounter for other specified  screening  -     Glucose, 1H PG; Future  -     CBC; Future  -     RPR, Rfx Qn RPR/Confirm TP; Future  -     Glucose, 1H PG  -     CBC  -     RPR, Rfx Qn RPR/Confirm TP    6  Prenatal care in second trimester    7  History of migraine during pregnancy      dw pt  28 week labs  Hx of  3 hour in last  2 preg  Low COOH dieta nd exercise reviewed  No cramping  Has 28 week   Fu US scheduled     Subjective:     CC: Prenatal care    Isaac Agustin is a 34 y o   female who presents for routine prenatal care at 26w5d  Pregnancy ROS: no  leakage of fluid, pelvic pain, or vaginal bleeding   + fetal movement      The following portions of the patient's history were reviewed and updated as appropriate: allergies, current medications, past family history, past medical history, obstetric history, gynecologic history, past social history, past surgical history and problem list       Objective:  /58   Ht 5' 5" (1 651 m)   Wt 75 1 kg (165 lb 9 6 oz)   LMP 2022 (Exact Date)   BMI 27 56 kg/m²   Pregravid Weight/BMI: 65 8 kg (145 lb) (BMI 24 13)  Current Weight: 75 1 kg (165 lb 9 6 oz)   Total Weight Gain: 9 344 kg (20 lb 9 6 oz)   Pre-Linnea Vitals    Flowsheet Row Most Recent Value   Prenatal Assessment    Fetal Heart Rate 138-148   Fundal Height (cm) 26 cm   Movement Present   Prenatal Vitals    Blood Pressure 100/58   Weight - Scale 75 1 kg (165 lb 9 6 oz)   Urine Albumin/Glucose Dilation/Effacement/Station    Vaginal Drainage    Draining Fluid No   Edema    LLE Edema Unable to assess   RLE Edema None   Facial Edema None           General: Well appearing, no distress  Respiratory: Unlabored breathing  Cardiovascular: Regular rate  Abdomen: Soft, gravid, nontender  Fundal Height: Appropriate for gestational age  Extremities: Warm and well perfused  Non tender

## 2022-12-08 NOTE — PROGRESS NOTES
Routine Prenatal Visit  [address]   [address], [address], [facility], [facility] 1    Assessment/Plan:  Steffany Davis is a 34y o  year old  at 26w5d who presents for routine prenatal visit  1  26 weeks gestation of pregnancy  -     POCT urine dip    2  Maternal care due to low transverse uterine scar from previous  delivery    3  History of maternal fourth degree perineal laceration, currently pregnant    4  Anxiety during pregnancy    5  Encounter for other specified  screening  -     Glucose, 1H PG; Future  -     CBC; Future  -     RPR, Rfx Qn RPR/Confirm TP; Future  -     Glucose, 1H PG  -     CBC  -     RPR, Rfx Qn RPR/Confirm TP    6  Prenatal care in second trimester    7  History of migraine during pregnancy      dw pt  28 week labs  Hx of  3 hour in last  2 preg  Low COOH dieta nd exercise reviewed  No cramping  Has 28 week   Fu US scheduled     Subjective:     CC: Prenatal care    Naomi Wynn is a 34 y o   female who presents for routine prenatal care at 26w5d  Pregnancy ROS: no  leakage of fluid, pelvic pain, or vaginal bleeding   + fetal movement      The following portions of the patient's history were reviewed and updated as appropriate: allergies, current medications, past family history, past medical history, obstetric history, gynecologic history, past social history, past surgical history and problem list       Objective:  /58   Ht 5' 5" (1 651 m)   Wt 75 1 kg (165 lb 9 6 oz)   LMP 2022 (Exact Date)   BMI 27 56 kg/m²   Pregravid Weight/BMI: 65 8 kg (145 lb) (BMI 24 13)  Current Weight: 75 1 kg (165 lb 9 6 oz)   Total Weight Gain: 9 344 kg (20 lb 9 6 oz)   Pre-Linnea Vitals    Flowsheet Row Most Recent Value   Prenatal Assessment    Fetal Heart Rate 138-148   Fundal Height (cm) 26 cm   Movement Present   Prenatal Vitals    Blood Pressure 100/58   Weight - Scale 75 1 kg (165 lb 9 6 oz)   Urine Albumin/Glucose Dilation/Effacement/Station    Vaginal Drainage    Draining Fluid No   Edema    LLE Edema Unable to assess   RLE Edema None   Facial Edema None           General: Well appearing, no distress  Respiratory: Unlabored breathing  Cardiovascular: Regular rate  Abdomen: Soft, gravid, nontender  Fundal Height: Appropriate for gestational age  Extremities: Warm and well perfused  Non tender

## 2022-12-08 NOTE — PATIENT INSTRUCTIONS
The Third Trimester  (28-42 weeks)  YOUR BABY   * your baby sucks its thumb now! * your baby can hear voices and respond to touch…   so talk to him or her!!   * your baby’s brain grows and develops most in the last 2 months of pregnancy   * baby’s head and bones are soft and flexible so they can fit through the birth canal   * baby’s movements change towards the end of pregnancy because there is less room for kicking and stretching in your belly   * baby’s lungs are not fully developed and completely ready to breathe on their own until the last 3-4 weeks before your due date    YOUR BODY   * your belly is growing a lot now   * it may become more difficult to sleep well at night or to be as active as you usually are   * you may sweat more than usual   * you will become more off-balance……be careful not to fall!!   * you may develop hemorrhoids (which can be painful and make it difficult to sit down)   * the last two months of pregnancy can become very uncomfortable……with backaches, headaches, and heartburn   * you can start to have contractions……  as long as they are irregular and less than 5 per hour, this is a normal part of your body getting ready to have a baby   * your cervix may start to thin out and open up……to get ready for delivery   * you may find yourself needing to “pee” very often……  because baby is pressing on your bladder so much   * you may get out of breathe more quickly than usual      FETAL KICK COUNTS    In the third trimester (after 28 weeks gestation) you should be performing fetal kick counts every day  Your baby should move at least 10 times in 2 hours during an active time, once a day  Choose atime of day when your baby is most active  Try to do this around the same time each day  Get into a comfortable position and then write down the time your baby first moves  Count each movement until the baby moves 10 times  These movements include kicks, punches, nudges, flutters, or rolls    This can take anywhere from 5 minutes to 2 hours  Write down the time you feel the baby's 10th movement  If 2 hours has passed and your baby has not moved at least 10 times, you should CALL THE OFFICE RIGHT AWAY  [phone]          PREMATURE LABOR     When to call [phone]:  * I need to call immediately if I have even a small amount of LIQUID leaking from my vagina, with or without contractions  * I need to call if I am BLEEDING from my vagina  * I need to call if I am feeling CRAMPING that continues after drinking 2-3 glasses of water and lying down on my side for one hour and that feels like I am having a period  * I need to call if I feel CONTRACTIONS  more than 4 times in an hour that feels like the baby is “balling up” even after I try drinking 2-3 glasses of water and lying down on my side for an hour  * I need to call if I notice a change in my vaginal DISCHARGE  * I need to call if I am feeling PELVIC PRESSURE  that feels like the baby is pushing down into my vagina and lasts more than an hour  * I need to call if I have LOW BACKACHE which is new and near my tailbone  It may either come and go several times during an hour or stay there constantly  PRE-ECLAMPSIA     What is it? Pre-eclampsia is a serious disease that can occur during pregnancy related to high blood pressures  It can happen to any woman  Why should I care? Women who develop pre-eclampsia have serious risks which can include seizures, stroke, organ damage, premature birth of their baby  In the very worst cases, it can cause death of the mother and/or their baby  What should I pay attention to?    Signs and symptoms of pre-eclampsia can include:   * Severe swelling of face or hands    * A headache that will not go away even after you have taken Tylenol   * Seeing spots or changes in eyesight    * Pain in the upper abdomen or shoulder    * New nausea and vomiting (in the second half of pregnancy)    * Sudden weight gain    * Difficulty breathing     What should I do? If you experience any of the above symptoms of pre-eclampsia, contact your OB provider  Finding pre-eclampsia early is important for you and your baby  Call us       BREASTFEEDING     BENEFITS FOR BABIES   * stronger immune systems (less allergies, eczema, asthma, and childhood cancers)   * less diarrhea and constipation or other GI diseases   * fewer colds and ear infections   * better vision and teeth (fewer cavities)   * improves IQ   * lower rates of diabetes and obesity in childhood     BENEFITS FOR MOMS   * promotes faster weight loss after delivery   * lower risk for postpartum depression   * lower risk for breast, uterine, and ovarian cancers   * lower risk for osteoporosis developing with age   * easier than formula - is always right with you, clean, and the right temperature   * less expensive than formula……it’s FREE !!!!     KEYS TO SUCCESSFUL BREASTFEEDING   * keep baby skin-to-skin until after first feeding event   * keep baby in your room with you during your hospital stay after delivery   * avoid any bottle feedings (unless medically necessary)   * limit the use of pacifiers and swaddling   * ask for help if you are having any issues……lactation consultants (who specialize in breastfeeding) are available to help you   * a healthy diet for mom……eating a variety of foods and portions in moderation    THINGS YOU SHOULD KNOW ABOUT BREASTFEEDING   * most medications are considered compatible with breastfeeding by the [address] [place] [address] [employer], but you should check with your health care provider or lactation consultant prior to taking a new medication……just to be sure it is safe   * alcohol (beer, wine, liquor) can be passed from mother to baby through breast milk……an occasional, social drink is deemed acceptable by the American Academy of [facility]   more than that should be avoided   * breastfeeding is NOT an effective method of birth control   * nicotine (in cigarettes) can pass from mother to baby through breast milk…   however, for mothers who smoke, it is still healthier to breastfeed than use formula   * caffeine should be limited to 1-3 cups per day……includes coffee, soda, energy drinks         PERINEAL / VAGINAL MASSAGE    What can I do now to decrease my chances of tearing during delivery? Massaging around the vaginal opening by you (or your partner), either antepartum (before birth) or during the second stage of labor, can reduce the likelihood of perineal tearing during childbirth  Likewise, the use of warm packs held on the perineum during the pushing stage of labor can reduce the severity of your tear  This will happen during the pushing stage of labor  At home, you can also help reduce the chances of injury that may occur during the birth of your child through perineal massage  When should I do this? Starting around or shortly after 34 weeks of pregnancy, you or your partner should provide 5-10 minutes of vaginal massage 1-4 times per week  How? Use either almond, coconut, or olive oil and water mixture on 1 or 2 fingers (depending on comfort)  Insert finger(s) 3-5cm into the vagina  Apply sweeping downward/sideward pressure from 3 to 9 o'clock for 5-10 minutes, 1-4 times per week  WARNING SIGNS DURING PREGNANCY  Call our office at [phone] if you experience any of the followin  Vaginal bleeding  2  Sharp abdominal pain that does not go away  3  Fever (more than 100 4 and is not relieved by Tylenol)  4  Persistent vomiting lasting greater than 24 hours  5  Chest pain   6  Pain or burning when you urinate  7  Severe headache that doesn't resolve with Tylenol  8  Blurred vision or seeing spots in your vision  9  Sudden swelling of your face or hands  10  Redness, swelling or pain in a leg  11  A sudden weight gain in just a few days  12   Decrease in your baby's movement (after 28 weeks or the 6th month of pregnancy)  13  A loss of watery fluid from your vagina - can be a gush, a trickle or continuous wetness  14  After 20 weeks of pregnancy, rhythmic cramping (greater than 4 per hour) or menstrual like low/pelvic pain          VACCINES IN PREGNANCY    TDAP  Whopping cough (or pertusSsis) can be serious for anyone, but for your , it can be life-threatning  Up to 20 babies die each year in the [place]  Due to whopping cough  About half of babies younger than 3year old who get whopping cough need treatment in the hospital   The younger the baby is when he or she gets whopping cough, the more likely he or she will need to be treated in a hospital   When you receive the whopping cough vaccine (Tdap) during your pregnancy, your body will create protective antibodies and pass some of them to your baby before birth  These antibodies can help protect your baby from getting whopping cough until they are old enough to be vaccinated themselves (usually around 7 months of age)  INFLUENZA  Changes in your immune, heart, and lung functions during pregnancy make you more likely to get seriously ill from the flu  Catching the flu also increases your chances for serious problems for your developing baby, including premature labor and delivery  It is recommended that all women who are pregnant during flu season should receive an influenza vaccine  F: n  E: monitor and replete  N: reg  GI: none  DVT: SCDs bilaterally; Eliquis 2.5 BID held in the setting of vaginal bleeding    Code Status: DNR/DNI  Dispo: rmf

## 2022-12-18 DIAGNOSIS — R73.09 ABNORMAL GLUCOSE TOLERANCE TEST (GTT): Primary | ICD-10-CM

## 2022-12-18 LAB
ERYTHROCYTE [DISTWIDTH] IN BLOOD BY AUTOMATED COUNT: 14.1 % (ref 11.7–15.4)
GLUCOSE 1H P 50 G GLC PO SERPL-MCNC: 148 MG/DL (ref 70–139)
HCT VFR BLD AUTO: 36.6 % (ref 34–46.6)
HGB BLD-MCNC: 12.3 G/DL (ref 11.1–15.9)
MCH RBC QN AUTO: 30.7 PG (ref 26.6–33)
MCHC RBC AUTO-ENTMCNC: 33.6 G/DL (ref 31.5–35.7)
MCV RBC AUTO: 91 FL (ref 79–97)
PLATELET # BLD AUTO: 158 X10E3/UL (ref 150–450)
RBC # BLD AUTO: 4.01 X10E6/UL (ref 3.77–5.28)
RPR SER QL: NON REACTIVE
WBC # BLD AUTO: 8.9 X10E3/UL (ref 3.4–10.8)

## 2022-12-22 ENCOUNTER — ROUTINE PRENATAL (OUTPATIENT)
Dept: OBGYN CLINIC | Facility: CLINIC | Age: 32
End: 2022-12-22

## 2022-12-22 VITALS
BODY MASS INDEX: 28.02 KG/M2 | DIASTOLIC BLOOD PRESSURE: 58 MMHG | HEIGHT: 65 IN | WEIGHT: 168.2 LBS | SYSTOLIC BLOOD PRESSURE: 90 MMHG

## 2022-12-22 DIAGNOSIS — F41.9 ANXIETY DURING PREGNANCY: ICD-10-CM

## 2022-12-22 DIAGNOSIS — O09.299 HISTORY OF MATERNAL FOURTH DEGREE PERINEAL LACERATION, CURRENTLY PREGNANT: ICD-10-CM

## 2022-12-22 DIAGNOSIS — O34.211 MATERNAL CARE DUE TO LOW TRANSVERSE UTERINE SCAR FROM PREVIOUS CESAREAN DELIVERY: Primary | ICD-10-CM

## 2022-12-22 DIAGNOSIS — O99.340 ANXIETY DURING PREGNANCY: ICD-10-CM

## 2022-12-22 DIAGNOSIS — O99.810 ABNORMAL MATERNAL GLUCOSE TOLERANCE, ANTEPARTUM: ICD-10-CM

## 2022-12-22 DIAGNOSIS — Z3A.28 28 WEEKS GESTATION OF PREGNANCY: ICD-10-CM

## 2022-12-22 DIAGNOSIS — Z23 NEED FOR TDAP VACCINATION: ICD-10-CM

## 2022-12-22 LAB
SL AMB  POCT GLUCOSE, UA: NEGATIVE
SL AMB POCT URINE PROTEIN: NEGATIVE

## 2022-12-22 NOTE — PROGRESS NOTES
Routine Prenatal Visit  [address]   [address], [address], [facility], [facility] 1    Assessment/Plan:  Michelina Cabot is a 34y o  year old  at 28w5d who presents for routine prenatal visit  1  Maternal care due to low transverse uterine scar from previous  delivery    2  History of maternal fourth degree perineal laceration, currently pregnant    3  Anxiety during pregnancy    4  Need for Tdap vaccination  -     TDAP VACCINE GREATER THAN OR EQUAL TO 6YO IM    5  28 weeks gestation of pregnancy  Comments: TDAp given  Assessment & Plan:  TDAP    Orders:  -     POCT urine dip    6  Abnormal maternal glucose tolerance, antepartum  Comments:  one hour  145   Declines  3 hour    Would like to do  1 week of  FS  Referral to DIPP  done  Orders:  -     Ambulatory Referral to Maternal Fetal Medicine; Future; Expected date: 2022    Abn one hour  Pt states in last  2 pregnancies had to doa the  3 hour  Geronimo  Horrible  Last  preg passed  3 hour by  1-2 points  Would like to do  Finger sticks  And  Education  Referral placed  Subjective:     CC: Prenatal care    Dennis Mcrae is a 34 y o   female who presents for routine prenatal care at 28w5d  Pregnancy ROS: no  leakage of fluid, pelvic pain, or vaginal bleeding   + fetal movement      The following portions of the patient's history were reviewed and updated as appropriate: allergies, current medications, past family history, past medical history, obstetric history, gynecologic history, past social history, past surgical history and problem list       Objective:  BP 90/58   Ht 5' 5" (1 651 m)   Wt 76 3 kg (168 lb 3 2 oz)   LMP 2022 (Exact Date)   BMI 27 99 kg/m²   Pregravid Weight/BMI: 65 8 kg (145 lb) (BMI 24 13)  Current Weight: 76 3 kg (168 lb 3 2 oz)   Total Weight Gain: 10 5 kg (23 lb 3 2 oz)   Pre- Vitals    Flowsheet Row Most Recent Value   Prenatal Assessment    Fetal Heart Rate 131 138 Fundal Height (cm) 30 cm   Movement Present   Prenatal Vitals    Blood Pressure 90/58   Weight - Scale 76 3 kg (168 lb 3 2 oz)   Urine Albumin/Glucose    Dilation/Effacement/Station    Vaginal Drainage    Draining Fluid No   Edema    LLE Edema None   RLE Edema None   Facial Edema None           General: Well appearing, no distress  Respiratory: Unlabored breathing  Cardiovascular: Regular rate  Abdomen: Soft, gravid, nontender  Fundal Height: Appropriate for gestational age  Extremities: Warm and well perfused  Non tender

## 2022-12-22 NOTE — PROGRESS NOTES
Yellow folder given to patient and instructed to complete forms and return with next visit  Also given breast pump application to complete and returned to nurse at [facility] 12/22/2022  No

## 2022-12-22 NOTE — PATIENT INSTRUCTIONS
The Third Trimester  (28-42 weeks)  YOUR BABY   * your baby sucks its thumb now! * your baby can hear voices and respond to touch…   so talk to him or her!!   * your baby’s brain grows and develops most in the last 2 months of pregnancy   * baby’s head and bones are soft and flexible so they can fit through the birth canal   * baby’s movements change towards the end of pregnancy because there is less room for kicking and stretching in your belly   * baby’s lungs are not fully developed and completely ready to breathe on their own until the last 3-4 weeks before your due date    YOUR BODY   * your belly is growing a lot now   * it may become more difficult to sleep well at night or to be as active as you usually are   * you may sweat more than usual   * you will become more off-balance……be careful not to fall!!   * you may develop hemorrhoids (which can be painful and make it difficult to sit down)   * the last two months of pregnancy can become very uncomfortable……with backaches, headaches, and heartburn   * you can start to have contractions……  as long as they are irregular and less than 5 per hour, this is a normal part of your body getting ready to have a baby   * your cervix may start to thin out and open up……to get ready for delivery   * you may find yourself needing to “pee” very often……  because baby is pressing on your bladder so much   * you may get out of breathe more quickly than usual      FETAL KICK COUNTS    In the third trimester (after 28 weeks gestation) you should be performing fetal kick counts every day  Your baby should move at least 10 times in 2 hours during an active time, once a day  Choose atime of day when your baby is most active  Try to do this around the same time each day  Get into a comfortable position and then write down the time your baby first moves  Count each movement until the baby moves 10 times  These movements include kicks, punches, nudges, flutters, or rolls    This can take anywhere from 5 minutes to 2 hours  Write down the time you feel the baby's 10th movement  If 2 hours has passed and your baby has not moved at least 10 times, you should CALL THE OFFICE RIGHT AWAY  [phone]          PREMATURE LABOR     When to call [phone]:  * I need to call immediately if I have even a small amount of LIQUID leaking from my vagina, with or without contractions  * I need to call if I am BLEEDING from my vagina  * I need to call if I am feeling CRAMPING that continues after drinking 2-3 glasses of water and lying down on my side for one hour and that feels like I am having a period  * I need to call if I feel CONTRACTIONS  more than 4 times in an hour that feels like the baby is “balling up” even after I try drinking 2-3 glasses of water and lying down on my side for an hour  * I need to call if I notice a change in my vaginal DISCHARGE  * I need to call if I am feeling PELVIC PRESSURE  that feels like the baby is pushing down into my vagina and lasts more than an hour  * I need to call if I have LOW BACKACHE which is new and near my tailbone  It may either come and go several times during an hour or stay there constantly  PRE-ECLAMPSIA     What is it? Pre-eclampsia is a serious disease that can occur during pregnancy related to high blood pressures  It can happen to any woman  Why should I care? Women who develop pre-eclampsia have serious risks which can include seizures, stroke, organ damage, premature birth of their baby  In the very worst cases, it can cause death of the mother and/or their baby  What should I pay attention to?    Signs and symptoms of pre-eclampsia can include:   * Severe swelling of face or hands    * A headache that will not go away even after you have taken Tylenol   * Seeing spots or changes in eyesight    * Pain in the upper abdomen or shoulder    * New nausea and vomiting (in the second half of pregnancy)    * Sudden weight gain    * Difficulty breathing     What should I do? If you experience any of the above symptoms of pre-eclampsia, contact your OB provider  Finding pre-eclampsia early is important for you and your baby  Call us       BREASTFEEDING     BENEFITS FOR BABIES   * stronger immune systems (less allergies, eczema, asthma, and childhood cancers)   * less diarrhea and constipation or other GI diseases   * fewer colds and ear infections   * better vision and teeth (fewer cavities)   * improves IQ   * lower rates of diabetes and obesity in childhood     BENEFITS FOR MOMS   * promotes faster weight loss after delivery   * lower risk for postpartum depression   * lower risk for breast, uterine, and ovarian cancers   * lower risk for osteoporosis developing with age   * easier than formula - is always right with you, clean, and the right temperature   * less expensive than formula……it’s FREE !!!!     KEYS TO SUCCESSFUL BREASTFEEDING   * keep baby skin-to-skin until after first feeding event   * keep baby in your room with you during your hospital stay after delivery   * avoid any bottle feedings (unless medically necessary)   * limit the use of pacifiers and swaddling   * ask for help if you are having any issues……lactation consultants (who specialize in breastfeeding) are available to help you   * a healthy diet for mom……eating a variety of foods and portions in moderation    THINGS YOU SHOULD KNOW ABOUT BREASTFEEDING   * most medications are considered compatible with breastfeeding by the [address] [place] [address] Academy of Pediatrics, but you should check with your health care provider or lactation consultant prior to taking a new medication……just to be sure it is safe   * alcohol (beer, wine, liquor) can be passed from mother to baby through breast milk……an occasional, social drink is deemed acceptable by the American Academy of [facility]   more than that should be avoided   * breastfeeding is NOT an effective method of birth control   * nicotine (in cigarettes) can pass from mother to baby through breast milk…   however, for mothers who smoke, it is still healthier to breastfeed than use formula   * caffeine should be limited to 1-3 cups per day……includes coffee, soda, energy drinks         PERINEAL / VAGINAL MASSAGE    What can I do now to decrease my chances of tearing during delivery? Massaging around the vaginal opening by you (or your partner), either antepartum (before birth) or during the second stage of labor, can reduce the likelihood of perineal tearing during childbirth  Likewise, the use of warm packs held on the perineum during the pushing stage of labor can reduce the severity of your tear  This will happen during the pushing stage of labor  At home, you can also help reduce the chances of injury that may occur during the birth of your child through perineal massage  When should I do this? Starting around or shortly after 34 weeks of pregnancy, you or your partner should provide 5-10 minutes of vaginal massage 1-4 times per week  How? Use either almond, coconut, or olive oil and water mixture on 1 or 2 fingers (depending on comfort)  Insert finger(s) 3-5cm into the vagina  Apply sweeping downward/sideward pressure from 3 to 9 o'clock for 5-10 minutes, 1-4 times per week  WARNING SIGNS DURING PREGNANCY  Call our office at [phone] if you experience any of the followin  Vaginal bleeding  2  Sharp abdominal pain that does not go away  3  Fever (more than 100 4 and is not relieved by Tylenol)  4  Persistent vomiting lasting greater than 24 hours  5  Chest pain   6  Pain or burning when you urinate  7  Severe headache that doesn't resolve with Tylenol  8  Blurred vision or seeing spots in your vision  9  Sudden swelling of your face or hands  10  Redness, swelling or pain in a leg  11  A sudden weight gain in just a few days  12   Decrease in your baby's movement (after 28 weeks or the 6th month of pregnancy)  13  A loss of watery fluid from your vagina - can be a gush, a trickle or continuous wetness  14  After 20 weeks of pregnancy, rhythmic cramping (greater than 4 per hour) or menstrual like low/pelvic pain          VACCINES IN PREGNANCY    TDAP  Whopping cough (or pertusSsis) can be serious for anyone, but for your , it can be life-threatning  Up to 20 babies die each year in the [place]  Due to whopping cough  About half of babies younger than 3year old who get whopping cough need treatment in the hospital   The younger the baby is when he or she gets whopping cough, the more likely he or she will need to be treated in a hospital   When you receive the whopping cough vaccine (Tdap) during your pregnancy, your body will create protective antibodies and pass some of them to your baby before birth  These antibodies can help protect your baby from getting whopping cough until they are old enough to be vaccinated themselves (usually around 7 months of age)  INFLUENZA  Changes in your immune, heart, and lung functions during pregnancy make you more likely to get seriously ill from the flu  Catching the flu also increases your chances for serious problems for your developing baby, including premature labor and delivery  It is recommended that all women who are pregnant during flu season should receive an influenza vaccine

## 2022-12-23 LAB
DME PARACHUTE DELIVERY DATE REQUESTED: NORMAL
DME PARACHUTE ITEM DESCRIPTION: NORMAL
DME PARACHUTE ORDER STATUS: NORMAL
DME PARACHUTE SUPPLIER NAME: NORMAL
DME PARACHUTE SUPPLIER PHONE: NORMAL

## 2022-12-27 ENCOUNTER — TELEMEDICINE (OUTPATIENT)
Facility: HOSPITAL | Age: 32
End: 2022-12-27

## 2022-12-27 DIAGNOSIS — Z83.3 FAMILY HISTORY OF DIABETES IN PREGNANCY: ICD-10-CM

## 2022-12-27 DIAGNOSIS — R73.09 ABNORMAL GLUCOSE TOLERANCE TEST (GTT): Primary | ICD-10-CM

## 2022-12-27 DIAGNOSIS — O99.810 ABNORMAL MATERNAL GLUCOSE TOLERANCE, ANTEPARTUM: ICD-10-CM

## 2022-12-27 DIAGNOSIS — Z3A.29 29 WEEKS GESTATION OF PREGNANCY: Primary | ICD-10-CM

## 2022-12-27 DIAGNOSIS — Z3A.33 33 WEEKS GESTATION OF PREGNANCY: ICD-10-CM

## 2022-12-27 DIAGNOSIS — Z83.3 FAMILY HISTORY OF DIABETES DURING PREGNANCY: ICD-10-CM

## 2022-12-27 RX ORDER — BLOOD-GLUCOSE METER
EACH MISCELLANEOUS
Qty: 1 KIT | Refills: 0 | Status: SHIPPED | OUTPATIENT
Start: 2022-12-27

## 2022-12-27 RX ORDER — LANCETS 33 GAUGE
EACH MISCELLANEOUS
Qty: 100 EACH | Refills: 0 | Status: SHIPPED | OUTPATIENT
Start: 2022-12-27

## 2022-12-27 RX ORDER — BLOOD SUGAR DIAGNOSTIC
STRIP MISCELLANEOUS
Qty: 50 STRIP | Refills: 0 | Status: SHIPPED | OUTPATIENT
Start: 2022-12-27

## 2022-12-27 NOTE — PROGRESS NOTES
Virtual Regular Visit    Verification of patient location:    Patient is located in the following state in which I hold an active license [place]      Assessment/Plan:    Problem List Items Addressed This Visit        Endocrine    Abnormal maternal glucose tolerance, antepartum       Other    28 weeks gestation of pregnancy - Primary   Other Visit Diagnoses     Family history of diabetes in pregnancy                   Reason for visit is   Chief Complaint   Patient presents with   • diabetes education   • Virtual Regular Visit        Encounter provider Nik French    Provider located at [address] [address]      Recent Visits  No visits were found meeting these conditions  Showing recent visits within past 7 days and meeting all other requirements  Today's Visits  Date Type Provider Dept   22 Telemedicine Nik French An    Showing today's visits and meeting all other requirements  Future Appointments  No visits were found meeting these conditions  Showing future appointments within next 150 days and meeting all other requirements       The patient was identified by name and date of birth  Hallie Flynn was informed that this is a telemedicine visit and that the visit is being conducted through the [address] Now platform  She agrees to proceed     My office door was closed  No one else was in the room  She acknowledged consent and understanding of privacy and security of the video platform  The patient has agreed to participate and understands they can discontinue the visit at any time  Patient is aware this is a billable service  Natalie Cason is a 34 y  o pregnany female          HPI     Past Medical History:   Diagnosis Date   • Abnormal glucose tolerance test (GTT) 2021    1 hr GTT: 159  3 hr GTT: 81  171  132  143 ( values elevated) - no GDM    • Anxiety History of Zoloft; currently taking Lexapro 50 mg and managed by PCP   • Gonorrhea 2013   • History of fourth degree perineal laceration 2018    Requested elective LTCS 2021   • Migraines     managed by neuro; history of Imitrex   • Papanicolaou smear 2019    neg   • Pyelectasis of fetus on prenatal ultrasound 2021    Lilibeth ALVES 2021 10:19:55 EST > Noted at 20 week US  Plan for repeat US at 28 weeks  [facility] 2021 11:09:03 EST >  Stable at 4-5mm bilaterally (Same measurements from 20 weeks)  MFM consult: "suggest notification of Pediatrics to ensure  evaluation of the urinary tract dilation occurs at 4-6 weeks of life"       Past Surgical History:   Procedure Laterality Date   •  SECTION     • SKIN LESION EXCISION      birth sara removed   • VAGINAL DELIVERY  2018    female       Current Outpatient Medications   Medication Sig Dispense Refill   • Escitalopram Oxalate (LEXAPRO PO) Take 50 mg by mouth     • Prenatal Vit-Fe Fumarate-FA (PRENATAL VITAMINS PO) Take by mouth       No current facility-administered medications for this visit  Allergies   Allergen Reactions   • Cat Hair Extract Rash       Review of Systems    Video Exam    There were no vitals filed for this visit  Physical Exam     I spent 30 minutes with patient today in which greater than 50% of the time was spent in counseling/coordination of care regarding pregnancy and glucose meter education  Demographics:  Language: English  Ethnicity: White/   Country of Origin: [place]  Highest grade completed: Post Graduate   Occupation: [email or web address]   Hours worked per week: Full Time (40 hours/week)  Shift worked: Other (8 am to 4 Pm )    Personal & Family History:  Personal history of diabetes? no  Family members with diabetes: Mother GDM 3 prior pregnancies  How many total pregnancies have you had? 3  How many children do you have?  2  Have you had a baby weighing larger than 9 lbs? No  Are you having swelling or fluid retention? no  Have you been placed on bedrest? no    Nutrition & Physical Activity:  Do you exercise? Walks twice a week  How many meals do you eat daily? 3  List times of meals: Breakfast: 7 am/ Lunch: noon/ Dinner: 6-6:30 PM  How many snacks do you eat daily? 1  List times of snacks: Other around 10 am  What type of diet are you following at home? Regular  Do you have special Voodoo or ethnic dietary preferences? no  Do you have any food allergies or intolerances? no  Do you receive WIC or food stamps? no  Learning preferences: How do you learn best? Lecture  How do you rate your health? Good  Who is your primary support person? Spouse  How do you cope with stress?talk    Thank you for referring your patient to [facility] Diabetes in Pregnancy Program      Lazaro Doran is a  34 y o  female who presents today for blood glucose monitoring during pregnancy  Patient is at 29w3d gestation, Estimated Date of Delivery: 3/11/23  Reviewed and updated the following from patients medical record: PMH, Problem List, Allergies, and Current Medications  Visit Diagnosis:  Abnormal Glucose Tolerance  Reason for meter education: Patient failed 1 hr GTT and preferred to avoid 3 hr GTT  Per OB notes patient felt horrible after 1 hr GTT and would prefer an alternative        Additional Pregnancy Complications:  Patient reported her mother had GDM with 3 of her pregnancies      Labs:    Lab Results   Component Value Date    [id number] 148 (H) 12/17/2022       No components found for: HGA1C    Medications:  No diabetes related medications    Anthropometrics:  Ht Readings from Last 3 Encounters:   12/22/22 5' 5" (1 651 m)   12/08/22 5' 5" (1 651 m)   11/09/22 5' 5" (1 651 m)     Wt Readings from Last 3 Encounters:   12/22/22 76 3 kg (168 lb 3 2 oz)   12/08/22 75 1 kg (165 lb 9 6 oz)   11/09/22 73 5 kg (162 lb)     Pre-gravid weight: 65 8 kg (145 lb)  Pre-gravid BMI: 24 13  Weight Change: 10 5 kg (23 lb 3 2 oz)  Weight gain recommendations: BMI (18 5-24 9) 25-35 lbs    Recent Ultra Sound Results:  Date:10/24/22  Fetal Growth: Normal  ANNABELLE: Normal  Next US: 22    Blood Glucose Monitoring:   Glucose Meter: OneTouch Verio Flex  Instructed on testing blood sugars: 4 x per day (Fasting, 2 hour after start of each meal) for 1 week to rule out GDM   Patient was advised to report her blood sugars to her OB at next appointment on 23  Gave instruction on site selection, skin preparation, loading strips and lancet device, meter activation, obtaining blood sample, test strip and lancet disposal and storage, and recording log book entries  Patient has good understanding of material covered and was able to test their own blood sugar in office today  Instruction for reporting blood sugar results weekly via:  Phone: [phone]   OR  My Chart (Message with image attachment, or Glucose Flowsheet)    Goal Blood Sugar Ranges:   Fastin-90 mg/dL  1 hour after the start of each meal: 140 mg/dL or less  2 hours after start of each meal: 120 mg/dL or less    Begin Time: 3:00 Pm  End Time: 3:24 PM    It was a pleasure working with them today  Please feel free to call with any questions or concerns      Lizzie Marie RD,LDN,CDE  Diabetes Educator  [facility]  Diabetes in Pregnancy Program  [address]  [address]

## 2022-12-29 ENCOUNTER — ULTRASOUND (OUTPATIENT)
Facility: HOSPITAL | Age: 32
End: 2022-12-29

## 2022-12-29 VITALS
HEART RATE: 86 BPM | DIASTOLIC BLOOD PRESSURE: 52 MMHG | WEIGHT: 167.4 LBS | BODY MASS INDEX: 27.89 KG/M2 | HEIGHT: 65 IN | SYSTOLIC BLOOD PRESSURE: 96 MMHG

## 2022-12-29 DIAGNOSIS — Z36.2 ENCOUNTER FOR FOLLOW-UP ULTRASOUND OF FETAL ANATOMY: ICD-10-CM

## 2022-12-29 DIAGNOSIS — O99.810 ABNORMAL MATERNAL GLUCOSE TOLERANCE, ANTEPARTUM: ICD-10-CM

## 2022-12-29 DIAGNOSIS — O09.893 SHORT INTERVAL BETWEEN PREGNANCIES AFFECTING PREGNANCY IN THIRD TRIMESTER, ANTEPARTUM: ICD-10-CM

## 2022-12-29 DIAGNOSIS — Z3A.28 28 WEEKS GESTATION OF PREGNANCY: Primary | ICD-10-CM

## 2022-12-29 DIAGNOSIS — O09.299 HISTORY OF MATERNAL FOURTH DEGREE PERINEAL LACERATION, CURRENTLY PREGNANT: ICD-10-CM

## 2022-12-29 DIAGNOSIS — Z36.89 ENCOUNTER FOR ULTRASOUND TO CHECK FETAL GROWTH: ICD-10-CM

## 2022-12-29 DIAGNOSIS — O34.211 MATERNAL CARE DUE TO LOW TRANSVERSE UTERINE SCAR FROM PREVIOUS CESAREAN DELIVERY: ICD-10-CM

## 2022-12-29 NOTE — PROGRESS NOTES
[address] [facility]an was seen today for fetal growth and followup missed anatomy ultrasound  See ultrasound report under "OB Procedures" tab  The time spent on this established patient on the encounter date included 5 minutes previsit service time reviewing records and precharting, 5 minutes face-to-face service time counseling regarding results and coordinating care, and  5 minutes charting, totalling 15 minutes    Please don't hesitate to contact our office with any concerns or questions   -Lissa Dykes MD

## 2023-01-05 ENCOUNTER — ROUTINE PRENATAL (OUTPATIENT)
Dept: OBGYN CLINIC | Facility: CLINIC | Age: 33
End: 2023-01-05

## 2023-01-05 VITALS
HEIGHT: 65 IN | WEIGHT: 169.4 LBS | DIASTOLIC BLOOD PRESSURE: 70 MMHG | SYSTOLIC BLOOD PRESSURE: 102 MMHG | BODY MASS INDEX: 28.22 KG/M2

## 2023-01-05 DIAGNOSIS — O34.211 MATERNAL CARE DUE TO LOW TRANSVERSE UTERINE SCAR FROM PREVIOUS CESAREAN DELIVERY: ICD-10-CM

## 2023-01-05 DIAGNOSIS — F41.9 ANXIETY DURING PREGNANCY: ICD-10-CM

## 2023-01-05 DIAGNOSIS — O09.299 HISTORY OF MATERNAL FOURTH DEGREE PERINEAL LACERATION, CURRENTLY PREGNANT: ICD-10-CM

## 2023-01-05 DIAGNOSIS — O99.810 ABNORMAL MATERNAL GLUCOSE TOLERANCE, ANTEPARTUM: ICD-10-CM

## 2023-01-05 DIAGNOSIS — Z3A.30 30 WEEKS GESTATION OF PREGNANCY: Primary | ICD-10-CM

## 2023-01-05 DIAGNOSIS — O99.340 ANXIETY DURING PREGNANCY: ICD-10-CM

## 2023-01-05 LAB
SL AMB  POCT GLUCOSE, UA: NEGATIVE
SL AMB POCT URINE PROTEIN: NORMAL

## 2023-01-05 NOTE — PROGRESS NOTES
Routine Prenatal Visit  [address] [facility], [address], [facility], [address] [facility]    Assessment/Plan:  Eden Loomis is a 34y o  year old  at 30w5d who presents for routine prenatal visit  1  30 weeks gestation of pregnancy  -     POCT urine dip    2  Maternal care due to low transverse uterine scar from previous  delivery    3  History of maternal fourth degree perineal laceration, currently pregnant    4  Anxiety during pregnancy    5  Abnormal maternal glucose tolerance, antepartum  Comments:  fs sugars  -   fbs  75-99    162 one value to             Subjective:     CC: Prenatal care    Lisandro Carranza is a 34 y o   female who presents for routine prenatal care at 30w5d  Pregnancy ROS: no  leakage of fluid, pelvic pain, or vaginal bleeding   +  fetal movement  The following portions of the patient's history were reviewed and updated as appropriate: allergies, current medications, past family history, past medical history, obstetric history, gynecologic history, past social history, past surgical history and problem list       Objective:  /70   Ht 5' 5" (1 651 m)   Wt 76 8 kg (169 lb 6 4 oz)   LMP 2022 (Exact Date)   BMI 28 19 kg/m²   Pregravid Weight/BMI: 65 8 kg (145 lb) (BMI 24 13)  Current Weight: 76 8 kg (169 lb 6 4 oz)   Total Weight Gain: 11 1 kg (24 lb 6 4 oz)   Pre- Vitals    Flowsheet Row Most Recent Value   Prenatal Assessment    Fetal Heart Rate 146-150   Fundal Height (cm) 31 cm   Movement Present   Prenatal Vitals    Blood Pressure 102/70   Weight - Scale 76 8 kg (169 lb 6 4 oz)   Urine Albumin/Glucose    Dilation/Effacement/Station    Vaginal Drainage    Draining Fluid No   Edema    LLE Edema Trace   RLE Edema Trace   Facial Edema None           General: Well appearing, no distress  Respiratory: Unlabored breathing  Cardiovascular: Regular rate    Abdomen: Soft, gravid, nontender  Fundal Height: Appropriate for gestational age   Extremities: Warm and well perfused  Non tender

## 2023-01-16 ENCOUNTER — ULTRASOUND (OUTPATIENT)
Dept: PERINATAL CARE | Facility: CLINIC | Age: 33
End: 2023-01-16

## 2023-01-16 VITALS
HEIGHT: 65 IN | BODY MASS INDEX: 28.56 KG/M2 | DIASTOLIC BLOOD PRESSURE: 56 MMHG | WEIGHT: 171.4 LBS | SYSTOLIC BLOOD PRESSURE: 90 MMHG | HEART RATE: 90 BPM

## 2023-01-16 DIAGNOSIS — Z36.89 ENCOUNTER FOR ULTRASOUND TO CHECK FETAL GROWTH: ICD-10-CM

## 2023-01-16 DIAGNOSIS — Z3A.32 32 WEEKS GESTATION OF PREGNANCY: Primary | ICD-10-CM

## 2023-01-16 DIAGNOSIS — O99.810 ABNORMAL MATERNAL GLUCOSE TOLERANCE, ANTEPARTUM: ICD-10-CM

## 2023-01-16 PROBLEM — O24.410 DIET CONTROLLED GESTATIONAL DIABETES MELLITUS (GDM) IN THIRD TRIMESTER: Status: RESOLVED | Noted: 2023-01-16 | Resolved: 2023-01-16

## 2023-01-16 PROBLEM — O24.410 DIET CONTROLLED GESTATIONAL DIABETES MELLITUS (GDM) IN THIRD TRIMESTER: Status: ACTIVE | Noted: 2023-01-16

## 2023-01-16 PROBLEM — F41.9 ANXIETY: Status: RESOLVED | Noted: 2021-04-15 | Resolved: 2023-01-16

## 2023-01-16 NOTE — PATIENT INSTRUCTIONS
Thank you for choosing us for your  care today  If you have any questions about your ultrasound or care, please do not hesitate to contact us or your primary obstetrician  Some general instructions for your pregnancy are:    Protect against coronavirus: get vaccinated - pregnant women are increased risk of severe COVID  Notify your primary care doctor if you have any symptoms  Exercise: Aim for 22 minutes per day (150 minutes per week) of regular exercise  Walking is great! Nutrition: aim for calcium-rich and iron-rich foods as well as healthy sources of protein  Learn about Preeclampsia: preeclampsia is a common, serious high blood pressure complication in pregnancy  A blood pressure of 347KTXO (systolic or top number) or 18NUMI (diastolic or bottom number) is not normal and needs evaluation by your doctor  Aspirin is sometimes prescribed in early pregnancy to prevent preeclampsia in women with risk factors - ask your obstetrician if you should be on this medication  If you smoke, try to reduce how many cigarettes you smoke or try to quit completely  Do not vape  Other warning signs to watch out for in pregnancy or postpartum: chest pain, obstructed breathing or shortness of breath, seizures, thoughts of hurting yourself or your baby, bleeding, a painful or swollen leg, fever, or headache (see [facility] POST-BIRTH Warning Signs campaign)  If these happen call 911  Itching is also not normal in pregnancy and if you experience this, especially over your hands and feet, potentially worse at night, notify your doctors

## 2023-01-16 NOTE — PROGRESS NOTES
[address]: Ms Danielito Akhtar was seen today for fetal growth assessment ultrasound  See ultrasound report under "OB Procedures" tab  The time spent on this established patient on the encounter date included 4 minutes previsit service time reviewing records and precharting, 4 minutes face-to-face service time counseling regarding results and coordinating care, and  4 minutes charting, totalling 12 minutes    Please don't hesitate to contact our office with any concerns or questions   -Dion Morales MD

## 2023-01-16 NOTE — LETTER
January 16, 2023     Tayo Fiore MD  [facility] [address]  [address] [address],[address]  [facility]    Patient: Lucy Ortega   YOB: 1990   Date of Visit: 1/16/2023       Dear Dr Nunez Ours:    Thank you for referring Lucy Ortega to me for evaluation  Below are my notes for this consultation  If you have questions, please do not hesitate to call me  I look forward to following your patient along with you           Sincerely,        Charlotte Magallon MD        CC: RAMÍREZ Ware

## 2023-01-18 ENCOUNTER — ROUTINE PRENATAL (OUTPATIENT)
Dept: OBGYN CLINIC | Facility: CLINIC | Age: 33
End: 2023-01-18

## 2023-01-18 VITALS
BODY MASS INDEX: 28.52 KG/M2 | WEIGHT: 171.2 LBS | HEIGHT: 65 IN | DIASTOLIC BLOOD PRESSURE: 64 MMHG | SYSTOLIC BLOOD PRESSURE: 102 MMHG

## 2023-01-18 DIAGNOSIS — O99.810 ABNORMAL MATERNAL GLUCOSE TOLERANCE, ANTEPARTUM: ICD-10-CM

## 2023-01-18 DIAGNOSIS — O34.211 MATERNAL CARE DUE TO LOW TRANSVERSE UTERINE SCAR FROM PREVIOUS CESAREAN DELIVERY: Primary | ICD-10-CM

## 2023-01-18 DIAGNOSIS — O09.299 HISTORY OF MATERNAL FOURTH DEGREE PERINEAL LACERATION, CURRENTLY PREGNANT: ICD-10-CM

## 2023-01-18 DIAGNOSIS — O99.340 ANXIETY DURING PREGNANCY: ICD-10-CM

## 2023-01-18 DIAGNOSIS — Z3A.32 32 WEEKS GESTATION OF PREGNANCY: ICD-10-CM

## 2023-01-18 DIAGNOSIS — F41.9 ANXIETY DURING PREGNANCY: ICD-10-CM

## 2023-01-18 LAB
SL AMB  POCT GLUCOSE, UA: NEGATIVE
SL AMB POCT URINE PROTEIN: NEGATIVE

## 2023-01-18 RX ORDER — ESCITALOPRAM OXALATE 20 MG/1
50 TABLET ORAL DAILY
COMMUNITY
End: 2023-01-20

## 2023-01-18 NOTE — ASSESSMENT & PLAN NOTE
- Home BG monitoring x 1 week completed  Values reviewed at prior visit  I requested that patient send log via [employer] for review and documentation in EMR, as it does appear that she may have had some elevated fasting values

## 2023-01-18 NOTE — ASSESSMENT & PLAN NOTE
- PTL/PPROM/Bleeding precautions given  Kick counts reviewed  - Third trimester labs reviewed  - Problem list updated, results console reviewed and updated with pertinent prenatal labs    - PMH, PSH, medications reviewed and updated as needed  - Return to office in 2wk(s) for routine prenatal care

## 2023-01-18 NOTE — PROGRESS NOTES
Routine Prenatal Visit  [address] [facility], [address], [facility], [address] [facility]    Assessment/Plan:  Darcella Lanes is a 34y o  year old  at 32w4d who presents for routine prenatal visit  1  Maternal care due to low transverse uterine scar from previous  delivery  Assessment & Plan:  - Repeat  scheduled for 3/6/2023       2  Anxiety during pregnancy  Assessment & Plan:  - Continue SSRI -- patient unsure whether Lexapro or Zoloft  Will confirm when she gets home and update via [email or web address] for accuracy in EMR  3  History of maternal fourth degree perineal laceration, currently pregnant    4  32 weeks gestation of pregnancy  Assessment & Plan:  - PTL/PPROM/Bleeding precautions given  Kick counts reviewed  - Third trimester labs reviewed  - Problem list updated, results console reviewed and updated with pertinent prenatal labs  - PMH, PSH, medications reviewed and updated as needed  - Return to office in 2wk(s) for routine prenatal care      Orders:  -     POCT urine dip    5  Abnormal maternal glucose tolerance, antepartum  Assessment & Plan:  - Home BG monitoring x 1 week completed  Values reviewed at prior visit  I requested that patient send log via [email or web address] for review and documentation in EMR, as it does appear that she may have had some elevated fasting values  Subjective:   Williams Billy is a 34 y o   who presents for routine prenatal care at 32w4d  Complaints today: No  LOF: No; VB: No; Contractions: No; FM: Present and normal    Objective:  /64 (BP Location: Left arm, Patient Position: Sitting, Cuff Size: Standard)   Ht 5' 5" (1 651 m)   Wt 77 7 kg (171 lb 3 2 oz)   LMP 2022 (Exact Date)   BMI 28 49 kg/m²     General: Well appearing, no distress  Respiratory: Unlabored breathing  Cardiovascular: Regular rate  Abdomen: Soft, gravid, nontender  Extremities: Warm and well perfused  Non tender      Pregravid Weight/BMI: 65 8 kg (145 lb) (BMI 24 13)  Current Weight: 77 7 kg (171 lb 3 2 oz)   Total Weight Gain: 11 9 kg (26 lb 3 2 oz)     Pre- Vitals    Flowsheet Row Most Recent Value   Prenatal Assessment    Fetal Heart Rate 145   Fundal Height (cm) 32 cm   Movement Present   Prenatal Vitals    Blood Pressure 102/64   Weight - Scale 77 7 kg (171 lb 3 2 oz)   Urine Albumin/Glucose    Dilation/Effacement/Station    Vaginal Drainage    Draining Fluid No   Edema    LLE Edema None   RLE Edema None   Facial Edema None           Landry Kerr MD  2023 4:33 PM

## 2023-01-18 NOTE — ASSESSMENT & PLAN NOTE
- Continue SSRI -- patient unsure whether Lexapro or Zoloft  Will confirm when she gets home and update via [employer] for accuracy in EMR

## 2023-01-19 ENCOUNTER — PATIENT MESSAGE (OUTPATIENT)
Dept: OBGYN CLINIC | Facility: CLINIC | Age: 33
End: 2023-01-19

## 2023-01-31 ENCOUNTER — HOSPITAL ENCOUNTER (OUTPATIENT)
Facility: HOSPITAL | Age: 33
Discharge: HOME | End: 2023-01-31
Attending: STUDENT IN AN ORGANIZED HEALTH CARE EDUCATION/TRAINING PROGRAM | Admitting: STUDENT IN AN ORGANIZED HEALTH CARE EDUCATION/TRAINING PROGRAM

## 2023-01-31 ENCOUNTER — TELEPHONE (OUTPATIENT)
Dept: OBGYN CLINIC | Facility: CLINIC | Age: 33
End: 2023-01-31

## 2023-01-31 VITALS
RESPIRATION RATE: 18 BRPM | WEIGHT: 171.2 LBS | BODY MASS INDEX: 28.52 KG/M2 | DIASTOLIC BLOOD PRESSURE: 76 MMHG | OXYGEN SATURATION: 100 % | SYSTOLIC BLOOD PRESSURE: 129 MMHG | TEMPERATURE: 97.5 F | HEIGHT: 65 IN | HEART RATE: 91 BPM

## 2023-01-31 DIAGNOSIS — B37.31 YEAST VAGINITIS: Primary | ICD-10-CM

## 2023-01-31 LAB
ALBUMIN SERPL BCP-MCNC: 2.6 G/DL (ref 3.5–5)
ALP SERPL-CCNC: 134 U/L (ref 46–116)
ALT SERPL W P-5'-P-CCNC: 19 U/L (ref 12–78)
ANION GAP SERPL CALCULATED.3IONS-SCNC: 13 MMOL/L (ref 4–13)
AST SERPL W P-5'-P-CCNC: 18 U/L (ref 5–45)
BACTERIA UR QL AUTO: ABNORMAL /HPF
BASOPHILS # BLD MANUAL: 0 THOUSAND/UL (ref 0–0.1)
BASOPHILS NFR MAR MANUAL: 0 % (ref 0–1)
BILIRUB SERPL-MCNC: 0.6 MG/DL (ref 0.2–1)
BILIRUB UR QL STRIP: NEGATIVE
BUN SERPL-MCNC: 3 MG/DL (ref 5–25)
CALCIUM ALBUM COR SERPL-MCNC: 8.9 MG/DL (ref 8.3–10.1)
CALCIUM SERPL-MCNC: 7.8 MG/DL (ref 8.3–10.1)
CHLORIDE SERPL-SCNC: 103 MMOL/L (ref 96–108)
CLARITY UR: ABNORMAL
CO2 SERPL-SCNC: 20 MMOL/L (ref 21–32)
COLOR UR: YELLOW
CREAT SERPL-MCNC: 0.58 MG/DL (ref 0.6–1.3)
DACRYOCYTES BLD QL SMEAR: PRESENT
DME PARACHUTE DELIVERY DATE REQUESTED: NORMAL
DME PARACHUTE ITEM DESCRIPTION: NORMAL
DME PARACHUTE ORDER STATUS: NORMAL
DME PARACHUTE SUPPLIER NAME: NORMAL
DME PARACHUTE SUPPLIER PHONE: NORMAL
EOSINOPHIL # BLD MANUAL: 0.21 THOUSAND/UL (ref 0–0.4)
EOSINOPHIL NFR BLD MANUAL: 2 % (ref 0–6)
ERYTHROCYTE [DISTWIDTH] IN BLOOD BY AUTOMATED COUNT: 13.9 % (ref 11.6–15.1)
GFR SERPL CREATININE-BSD FRML MDRD: 121 ML/MIN/1.73SQ M
GLUCOSE SERPL-MCNC: 130 MG/DL (ref 65–140)
GLUCOSE UR STRIP-MCNC: NEGATIVE MG/DL
HCT VFR BLD AUTO: 35 % (ref 34.8–46.1)
HGB BLD-MCNC: 12 G/DL (ref 11.5–15.4)
HGB UR QL STRIP.AUTO: NEGATIVE
KETONES UR STRIP-MCNC: ABNORMAL MG/DL
LEUKOCYTE ESTERASE UR QL STRIP: ABNORMAL
LYMPHOCYTES # BLD AUTO: 0.31 THOUSAND/UL (ref 0.6–4.47)
LYMPHOCYTES # BLD AUTO: 3 % (ref 14–44)
MCH RBC QN AUTO: 30.4 PG (ref 26.8–34.3)
MCHC RBC AUTO-ENTMCNC: 34.3 G/DL (ref 31.4–37.4)
MCV RBC AUTO: 89 FL (ref 82–98)
METAMYELOCYTES NFR BLD MANUAL: 1 % (ref 0–1)
MONOCYTES # BLD AUTO: 0.52 THOUSAND/UL (ref 0–1.22)
MONOCYTES NFR BLD: 5 % (ref 4–12)
MUCOUS THREADS UR QL AUTO: ABNORMAL
MYELOCYTES NFR BLD MANUAL: 3 % (ref 0–1)
NEUTROPHILS # BLD MANUAL: 8.95 THOUSAND/UL (ref 1.85–7.62)
NEUTS BAND NFR BLD MANUAL: 3 % (ref 0–8)
NEUTS SEG NFR BLD AUTO: 83 % (ref 43–75)
NITRITE UR QL STRIP: NEGATIVE
NON-SQ EPI CELLS URNS QL MICRO: ABNORMAL /HPF
PH UR STRIP.AUTO: 5.5 [PH]
PLATELET # BLD AUTO: 133 THOUSANDS/UL (ref 149–390)
PLATELET BLD QL SMEAR: ADEQUATE
PMV BLD AUTO: 10.9 FL (ref 8.9–12.7)
POTASSIUM SERPL-SCNC: 3.5 MMOL/L (ref 3.5–5.3)
PROT SERPL-MCNC: 6.6 G/DL (ref 6.4–8.4)
PROT UR STRIP-MCNC: ABNORMAL MG/DL
RBC # BLD AUTO: 3.95 MILLION/UL (ref 3.81–5.12)
RBC #/AREA URNS AUTO: ABNORMAL /HPF
RBC MORPH BLD: PRESENT
SARS-COV-2 RNA RESP QL NAA+PROBE: NEGATIVE
SODIUM SERPL-SCNC: 136 MMOL/L (ref 135–147)
SP GR UR STRIP.AUTO: 1.02 (ref 1–1.03)
UROBILINOGEN UR STRIP-ACNC: 2 MG/DL
WBC # BLD AUTO: 10.41 THOUSAND/UL (ref 4.31–10.16)
WBC #/AREA URNS AUTO: ABNORMAL /HPF

## 2023-01-31 RX ORDER — CLOTRIMAZOLE 1 %
1 CREAM WITH APPLICATOR VAGINAL
Qty: 45 G | Refills: 0 | Status: SHIPPED | OUTPATIENT
Start: 2023-01-31 | End: 2023-02-07

## 2023-01-31 NOTE — PROGRESS NOTES
Triage Note - OB  Lazaro Doran 34 y o  female MRN: [id number]  Unit/Bed#: LD TRIAGE 2 Encounter: [id number]    A/P: [id number] at 34w3d presents with complaint of contractions and nausea  - Clinically stable and effectively ruled out for  labor based on exam and monitoring    - Labs sent: CBC, CMP, UA/UCx, COVID-19 swab  - WBC 10 41; unremarkable for pregnancy   - Plt 133; likely gestational   - Creatinine, AST/ALT all normal for pregnancy   - UA notable for moderate leukocytes, trace protein, small WBC and moderate epithelial cells, moderate bacteria, and occasional mucus  Results consistent with likely contamination with genital karo  Will await results of UCx and treat if positive  - Recommend increased hydration with electrolyte-rich fluids  - Will treat yeast vaginitis, as this is likely causing uterine irritability via cervical inflammation  Reviewed expectation for post-exam spotting in setting of cervical friability    - Return precautions reviewed  Patient instructed to call if cramping increases in intensity/frequency or if VB, LOF, or decreased FM  Otherwise, continue routine prenatal care  Yeast vaginitis  - Exam and wet mount consistent with yeast  Likely etiology of patient's cramping  Effectively ruled out for  labor based on exam and absence of regular uterine activity on tocometer    - Will treat with clotrimazole cream x 7 nights  Rx sent        _____________________________    Chief Complaint: Contractions, nausea, decreased appetite, nasal congestion  LIV: Estimated Date of Delivery: 3/11/23    HPI: Patient is a [id number] at 34w3d here for evaluation of contractions  She reports mild contractions every 15 minutes today  She reports associated nausea and decreased appetite  Denies LOF or VB  No vaginal discharge, dysuria, urinary urgency/frequency  Reports normal fetal movement  Of note, she does report that she and her  are getting over cold-like symptoms  They have not tested for COVID   Problems (from 22 to present)     Problem Noted Resolved    32 weeks gestation of pregnancy 2022 by Faye Slaughter MD No    Maternal care due to low transverse uterine scar from previous  delivery 2022 by Faye Slaughter MD No    Overview Signed 2022  3:21 PM by Faye Slaughter MD     Elected primary c/s in second pregnancy due to history of 4th degree laceration    Desires repeat  section         Anxiety during pregnancy 2022 by Faye Slaughter MD No    Overview Addendum 2023  8:13 AM by Faye Slaughter MD     On Zoloft 50 mg PO daily         History of maternal fourth degree perineal laceration, currently pregnant 2021 by Faye Slaughter MD No    Overview Addendum 2022  3:20 PM by Faye Slaughter MD      pregnancy: VAVD with 4th degree laceration  Baby 7 lb 15 oz, patient reports difficulty healing  No residual incontinence or pelvic pain   pregnancy: elected c/s after 3rd trimester US revealed LGA fetus  : Plans repeat c/s               Vitals:   /76 (BP Location: Right arm)   Pulse (!) 108   Temp 97 5 °F (36 4 °C) (Tympanic)   Resp 18   Ht 5' 5" (1 651 m)   Wt 77 7 kg (171 lb 3 2 oz)   LMP 2022 (Exact Date)   BMI 28 49 kg/m²   Body mass index is 28 49 kg/m²  Physical Exam  GEN: Well appearing, NAD   ABD: Gravid, soft, non-tender  SVE: Cervical Dilation: Fingertip  Cervical Effacement: 20  Cervical Consistency: Firm  Fetal Station: Ballotable  Presentation: Vertex  Method: Manual  OB Examiner: Ariella Carranza    SSE: Thick, grey-white discharge present  Cervical contact bleeding occurred with insertion of speculum  Wet mount: +Hyphae  No clue cells or trichomonads      FHT:  Baseline Rate: 145 bpm  Variability: Moderate 6-25 bpm  Accelerations: 15 x 15 or greater  Decelerations: None  TOCO:   Contraction Frequency (minutes): Rare  Contraction Duration (seconds):   Contraction Quality: Mild    Labs:   Recent Results (from the past 24 hour(s))   CBC and differential    Collection Time: 01/31/23  1:30 PM   Result Value Ref Range    WBC 10 41 (H) 4 31 - 10 16 Thousand/uL    RBC 3 95 3 81 - 5 12 Million/uL    Hemoglobin 12 0 11 5 - 15 4 g/dL    Hematocrit 35 0 34 8 - 46 1 %    MCV 89 82 - 98 fL    MCH 30 4 26 8 - 34 3 pg    MCHC 34 3 31 4 - 37 4 g/dL    RDW 13 9 11 6 - 15 1 %    MPV 10 9 8 9 - 12 7 fL    Platelets 533 (L) 961 - 390 Thousands/uL   Comprehensive metabolic panel    Collection Time: 01/31/23  1:30 PM   Result Value Ref Range    Sodium 136 135 - 147 mmol/L    Potassium 3 5 3 5 - 5 3 mmol/L    Chloride 103 96 - 108 mmol/L    CO2 20 (L) 21 - 32 mmol/L    ANION GAP 13 4 - 13 mmol/L    BUN 3 (L) 5 - 25 mg/dL    Creatinine 0 58 (L) 0 60 - 1 30 mg/dL    Glucose 130 65 - 140 mg/dL    Calcium 7 8 (L) 8 3 - 10 1 mg/dL    Corrected Calcium 8 9 8 3 - 10 1 mg/dL    AST 18 5 - 45 U/L    ALT 19 12 - 78 U/L    Alkaline Phosphatase 134 (H) 46 - 116 U/L    Total Protein 6 6 6 4 - 8 4 g/dL    Albumin 2 6 (L) 3 5 - 5 0 g/dL    Total Bilirubin 0 60 0 20 - 1 00 mg/dL    eGFR 121 ml/min/1 73sq m   Urinalysis with microscopic    Collection Time: 01/31/23  1:30 PM   Result Value Ref Range    Color, UA Yellow     Clarity, UA Slightly Cloudy     Specific [place], UA 1 025 1 005 - 1 030    pH, UA 5 5 4 5, 5 0, 5 5, 6 0, 6 5, 7 0, 7 5, 8 0    Leukocytes, UA Moderate (A) Negative    Nitrite, UA Negative Negative    Protein, UA Trace (A) Negative mg/dl    Glucose, UA Negative Negative mg/dl    Ketones, UA 80 (3+) (A) Negative mg/dl    Urobilinogen, UA 2 0 (A) <2 0 mg/dl mg/dl    Bilirubin, UA Negative Negative    Occult Blood, UA Negative Negative    RBC, UA 0-1 (A) None Seen, 2-4 /hpf    WBC, UA 4-10 (A) None Seen, 2-4, 5-60 /hpf    Epithelial Cells Moderate (A) None Seen, Occasional /hpf    Bacteria, UA Moderate (A) None Seen, Occasional /hpf    MUCUS THREADS Occasional (A) None Seen Manual Differential(PHLEBS Do Not Order)    Collection Time: 01/31/23  1:30 PM   Result Value Ref Range    Segmented % 83 (H) 43 - 75 %    Bands % 3 0 - 8 %    Lymphocytes % 3 (L) 14 - 44 %    Monocytes % 5 4 - 12 %    Eosinophils, % 2 0 - 6 %    Basophils % 0 0 - 1 %    Metamyelocytes% 1 0 - 1 %    Myelocytes % 3 (H) 0 - 1 %    Absolute Neutrophils 8 95 (H) 1 85 - 7 62 Thousand/uL    Lymphocytes Absolute 0 31 (L) 0 60 - 4 47 Thousand/uL    Monocytes Absolute 0 52 0 00 - 1 22 Thousand/uL    Eosinophils Absolute 0 21 0 00 - 0 40 Thousand/uL    Basophils Absolute 0 00 0 00 - 0 10 Thousand/uL    Total Counted      RBC Morphology Present     Tear Drop Cells Present     Platelet Estimate Adequate Adequate       Imaging: Bedside ultrasound performed for confirmation of presentation  Fetus in vertex presentation  Lab, Imaging and other studies: I have personally reviewed pertinent reports      Lisandro White MD  1/31/2023 3:28 PM

## 2023-01-31 NOTE — TELEPHONE ENCOUNTER
Selena called reporting consistant contractions every 15 minutes, lasting 1 minute since last evening  She has been increased her water intake this morning with no change in symptoms  Generally does not feel well, has no appetite and is nauseated  Advised to obtain transportation and proceed to [address] [place] [address]/D for evaluation  Notified Dr Dat Agrawal who is in agreement to plan

## 2023-01-31 NOTE — ASSESSMENT & PLAN NOTE
- Exam and wet mount consistent with yeast  Likely etiology of patient's cramping  Effectively ruled out for  labor based on exam and absence of regular uterine activity on tocometer    - Will treat with clotrimazole cream x 7 nights  Rx sent

## 2023-01-31 NOTE — PROCEDURES
P[place] bonilla [id number] at 34w3d with an LIV of 3/11/2023, by Last Menstrual Period, was seen at [address] [place] [address],[address] for the following procedure(s): $Procedure Type: NST]    Nonstress Test  Reason for NST: Other (Comment) ( contractions)  Decelerations: None  Accelerations: Yes  Acoustic Stimulator: No  Baseline: 145 BPM  Uterine Irritability: No  Contractions: Irregular        Interpretation  Nonstress Test Interpretation: Reactive  Overall Impression: Reassuring    See triage note for complete evaluation    Agustin Ballard MD  2023 3:23 PM

## 2023-02-01 LAB — BACTERIA UR CULT: NORMAL

## 2023-02-02 ENCOUNTER — ROUTINE PRENATAL (OUTPATIENT)
Dept: OBGYN CLINIC | Facility: CLINIC | Age: 33
End: 2023-02-02

## 2023-02-02 VITALS
DIASTOLIC BLOOD PRESSURE: 68 MMHG | BODY MASS INDEX: 28.42 KG/M2 | HEIGHT: 65 IN | WEIGHT: 170.6 LBS | SYSTOLIC BLOOD PRESSURE: 120 MMHG

## 2023-02-02 DIAGNOSIS — O34.211 MATERNAL CARE DUE TO LOW TRANSVERSE UTERINE SCAR FROM PREVIOUS CESAREAN DELIVERY: ICD-10-CM

## 2023-02-02 DIAGNOSIS — B37.31 YEAST VAGINITIS: ICD-10-CM

## 2023-02-02 DIAGNOSIS — O99.340 ANXIETY DURING PREGNANCY: Primary | ICD-10-CM

## 2023-02-02 DIAGNOSIS — O09.299 HISTORY OF MATERNAL FOURTH DEGREE PERINEAL LACERATION, CURRENTLY PREGNANT: ICD-10-CM

## 2023-02-02 DIAGNOSIS — Z3A.34 34 WEEKS GESTATION OF PREGNANCY: ICD-10-CM

## 2023-02-02 DIAGNOSIS — F41.9 ANXIETY DURING PREGNANCY: Primary | ICD-10-CM

## 2023-02-02 DIAGNOSIS — F41.9 ANXIETY: ICD-10-CM

## 2023-02-02 LAB
SL AMB  POCT GLUCOSE, UA: NEGATIVE
SL AMB POCT URINE PROTEIN: NEGATIVE

## 2023-02-02 NOTE — ASSESSMENT & PLAN NOTE
Continue to monitor irregular contractions  Aware of s/s to call with  Continue routine prenatal care  Return to office for ob check in 2 weeks

## 2023-02-02 NOTE — PROGRESS NOTES
Routine Prenatal Visit  [address] [facility], [address], [facility], [address] [facility]    Assessment/Plan:  Tatianna Muse is a 34y o  year old  at 34w5d who presents for routine prenatal visit  1  Anxiety during pregnancy  Assessment & Plan:  Continue Zoloft 50mg daily  2  34 weeks gestation of pregnancy  Assessment & Plan:  Continue to monitor irregular contractions  Aware of s/s to call with  Continue routine prenatal care  Return to office for ob check in 2 weeks  Orders:  -     POCT urine dip    3  Maternal care due to low transverse uterine scar from previous  delivery    4  History of maternal fourth degree perineal laceration, currently pregnant    5  Yeast vaginitis    6  Anxiety      Next OB Visit 2 weeks  Subjective:     CC: Prenatal care    Suri Santiago is a 34 y o  Royetta Ashley female who presents for routine prenatal care at 34w5d  Pregnancy ROS: Good FM, nausea improved  Went to L&D on 2023 for contractions  Diagnosed with yeast infection and treated, reports contractions not as bad but still occurring sporadically  Nothing regular or timeable  Reports nausea, worse after eating  No HA, VB, LOF, edema, domestic violence, or smoking  Tolerating PNV          The following portions of the patient's history were reviewed and updated as appropriate: allergies, current medications, past family history, past medical history, obstetric history, gynecologic history, past social history, past surgical history and problem list       Objective:  /68   Ht 5' 5" (1 651 m)   Wt 77 4 kg (170 lb 9 6 oz)   LMP 2022 (Exact Date)   BMI 28 39 kg/m²   Pregravid Weight/BMI: 65 8 kg (145 lb) (BMI 24 13)  Current Weight: 77 4 kg (170 lb 9 6 oz)   Total Weight Gain: 11 6 kg (25 lb 9 6 oz)   Pre- Vitals    Flowsheet Row Most Recent Value   Prenatal Assessment    Movement Present   Prenatal Vitals    Blood Pressure 120/68   Weight - Scale 77 4 kg (170 lb 9 6 oz) Urine Albumin/Glucose    Dilation/Effacement/Station    Vaginal Drainage    Edema    LLE Edema None   RLE Edema None   Facial Edema None           General: Well appearing, no distress  Abdomen: Soft, gravid, nontender  Fundal Height: Appropriate for gestational age  Extremities: Warm and well perfused  Non tender

## 2023-02-03 DIAGNOSIS — O99.891 ASYMPTOMATIC BACTERIURIA DURING PREGNANCY: Primary | ICD-10-CM

## 2023-02-03 DIAGNOSIS — R82.71 ASYMPTOMATIC BACTERIURIA DURING PREGNANCY: Primary | ICD-10-CM

## 2023-02-06 LAB
DME PARACHUTE DELIVERY DATE ACTUAL: NORMAL
DME PARACHUTE DELIVERY DATE REQUESTED: NORMAL
DME PARACHUTE ITEM DESCRIPTION: NORMAL
DME PARACHUTE ORDER STATUS: NORMAL
DME PARACHUTE SUPPLIER NAME: NORMAL
DME PARACHUTE SUPPLIER PHONE: NORMAL

## 2023-02-16 ENCOUNTER — ROUTINE PRENATAL (OUTPATIENT)
Dept: OBGYN CLINIC | Facility: CLINIC | Age: 33
End: 2023-02-16

## 2023-02-16 VITALS
DIASTOLIC BLOOD PRESSURE: 70 MMHG | SYSTOLIC BLOOD PRESSURE: 122 MMHG | BODY MASS INDEX: 28.49 KG/M2 | HEIGHT: 65 IN | WEIGHT: 171 LBS

## 2023-02-16 DIAGNOSIS — O99.891 ASYMPTOMATIC BACTERIURIA DURING PREGNANCY: ICD-10-CM

## 2023-02-16 DIAGNOSIS — Z3A.36 36 WEEKS GESTATION OF PREGNANCY: Primary | ICD-10-CM

## 2023-02-16 DIAGNOSIS — Z3A.34 34 WEEKS GESTATION OF PREGNANCY: ICD-10-CM

## 2023-02-16 DIAGNOSIS — F41.9 ANXIETY DURING PREGNANCY: ICD-10-CM

## 2023-02-16 DIAGNOSIS — Z36.85 ANTENATAL SCREENING FOR STREPTOCOCCUS B: ICD-10-CM

## 2023-02-16 DIAGNOSIS — O34.211 MATERNAL CARE DUE TO LOW TRANSVERSE UTERINE SCAR FROM PREVIOUS CESAREAN DELIVERY: ICD-10-CM

## 2023-02-16 DIAGNOSIS — B37.31 YEAST VAGINITIS: ICD-10-CM

## 2023-02-16 DIAGNOSIS — O09.299 HISTORY OF MATERNAL FOURTH DEGREE PERINEAL LACERATION, CURRENTLY PREGNANT: ICD-10-CM

## 2023-02-16 DIAGNOSIS — R82.71 ASYMPTOMATIC BACTERIURIA DURING PREGNANCY: ICD-10-CM

## 2023-02-16 DIAGNOSIS — O99.340 ANXIETY DURING PREGNANCY: ICD-10-CM

## 2023-02-16 LAB
SL AMB  POCT GLUCOSE, UA: NEGATIVE
SL AMB POCT URINE PROTEIN: NORMAL

## 2023-02-16 NOTE — PROGRESS NOTES
Routine Prenatal Visit  [address]   [address], [address], [facility], [facility] [address]    Assessment/Plan:  Gabriella Anders is a 34y o  year old  at 44w9d who presents for routine prenatal visit  1  36 weeks gestation of pregnancy  -     POCT urine dip    2   screening for streptococcus B  -     Strep B DNA probe, amplification    3  Yeast vaginitis    4  34 weeks gestation of pregnancy    5  Maternal care due to low transverse uterine scar from previous  delivery    6  Anxiety during pregnancy    7  History of maternal fourth degree perineal laceration, currently pregnant    8  Asymptomatic bacteriuria during pregnancy  -     Urine culture    Pt seen in   LD on  urine w  Mixed  [place]  Needs repeat clean catch  Culture done today + fm  Irregular  UTCX no leaking of fluid  Grp b done today and consent for delivery  Needs consent for surgery next visit  Preop instructions and  Shower  Given to pt  Subjective:     CC: Prenatal care    Gladys Pierce is a 34 y o   female who presents for routine prenatal care at 36w5d  Pregnancy ROS: no  leakage of fluid, pelvic pain, or vaginal bleeding   + fetal movement      The following portions of the patient's history were reviewed and updated as appropriate: allergies, current medications, past family history, past medical history, obstetric history, gynecologic history, past social history, past surgical history and problem list       Objective:  /70   Ht 5' 5" (1 651 m)   Wt 77 6 kg (171 lb)   LMP 2022 (Exact Date)   BMI 28 46 kg/m²   Pregravid Weight/BMI: 65 8 kg (145 lb) (BMI 24 13)  Current Weight: 77 6 kg (171 lb)   Total Weight Gain: 11 8 kg (26 lb)   Pre- Vitals    Flowsheet Row Most Recent Value   Prenatal Assessment    Fetal Heart Rate 142   Fundal Height (cm) 37 cm   Movement Present   Prenatal Vitals    Blood Pressure 122/70   Weight - Scale 77 6 kg (171 lb)   Urine Albumin/Glucose Dilation/Effacement/Station    Cervical Dilation 1 5   Cervical Effacement 30   Fetal Station -3   Vaginal Drainage    Draining Fluid No   Edema    LLE Edema None   RLE Edema None   Facial Edema None           General: Well appearing, no distress  Respiratory: Unlabored breathing  Cardiovascular: Regular rate  Abdomen: Soft, gravid, nontender  Fundal Height: Appropriate for gestational age  Extremities: Warm and well perfused  Non tender

## 2023-02-18 LAB
BACTERIA UR CULT: NORMAL
GP B STREP DNA SPEC QL NAA+PROBE: POSITIVE
Lab: NO GROWTH

## 2023-02-19 DIAGNOSIS — B95.1 GROUP B STREPTOCOCCAL INFECTION DURING PREGNANCY: Primary | ICD-10-CM

## 2023-02-19 DIAGNOSIS — O98.819 GROUP B STREPTOCOCCAL INFECTION DURING PREGNANCY: Primary | ICD-10-CM

## 2023-02-24 ENCOUNTER — ROUTINE PRENATAL (OUTPATIENT)
Dept: OBGYN CLINIC | Facility: CLINIC | Age: 33
End: 2023-02-24

## 2023-02-24 VITALS
DIASTOLIC BLOOD PRESSURE: 70 MMHG | BODY MASS INDEX: 28.82 KG/M2 | HEIGHT: 65 IN | SYSTOLIC BLOOD PRESSURE: 100 MMHG | WEIGHT: 173 LBS

## 2023-02-24 DIAGNOSIS — O34.211 MATERNAL CARE DUE TO LOW TRANSVERSE UTERINE SCAR FROM PREVIOUS CESAREAN DELIVERY: ICD-10-CM

## 2023-02-24 DIAGNOSIS — Z3A.37 37 WEEKS GESTATION OF PREGNANCY: ICD-10-CM

## 2023-02-24 DIAGNOSIS — B95.1 GROUP B STREPTOCOCCAL INFECTION DURING PREGNANCY: Primary | ICD-10-CM

## 2023-02-24 DIAGNOSIS — O99.340 ANXIETY DURING PREGNANCY: ICD-10-CM

## 2023-02-24 DIAGNOSIS — O09.299 HISTORY OF MATERNAL FOURTH DEGREE PERINEAL LACERATION, CURRENTLY PREGNANT: ICD-10-CM

## 2023-02-24 DIAGNOSIS — F41.9 ANXIETY DURING PREGNANCY: ICD-10-CM

## 2023-02-24 DIAGNOSIS — O98.819 GROUP B STREPTOCOCCAL INFECTION DURING PREGNANCY: Primary | ICD-10-CM

## 2023-02-24 PROBLEM — G43.009 MIGRAINE WITHOUT AURA AND WITHOUT STATUS MIGRAINOSUS, NOT INTRACTABLE: Status: ACTIVE | Noted: 2022-06-16

## 2023-02-24 LAB
SL AMB  POCT GLUCOSE, UA: NORMAL
SL AMB POCT URINE PROTEIN: NORMAL

## 2023-02-24 NOTE — PROGRESS NOTES
Routine Prenatal Visit  [address] [facility], [address], [facility], [address] [facility]    Assessment/Plan:  Parisa Gomez is a 34y o  year old  at 41w10d who presents for routine prenatal visit  1  Group B streptococcal infection during pregnancy  Assessment & Plan:  Reviewed GBS positive  Will treat at delivery  2  37 weeks gestation of pregnancy  Assessment & Plan:  Continue to monitor for s/s of labor  Continue routine prenatal care  Return to office in 1 week for ob check  Repeat  scheduled 3/6/2023  Orders:  -     POCT urine dip    3  Maternal care due to low transverse uterine scar from previous  delivery    4  Anxiety during pregnancy    5  History of maternal fourth degree perineal laceration, currently pregnant      Next OB Visit 1 week  Subjective:     CC: Prenatal care    Lissy Turner is a 34 y o   female who presents for routine prenatal care at 37w6d  Pregnancy ROS: Good FM, still with irregular contractions  Reports two night ago had intense contractions for about 2 5 hours and then went away on own  Typically happens at night  No FM, N/V, HA, VB, LOF, edema, domestic violence, or smoking  Tolerating PNV          The following portions of the patient's history were reviewed and updated as appropriate: allergies, current medications, past family history, past medical history, obstetric history, gynecologic history, past social history, past surgical history and problem list       Objective:  /70 (BP Location: Right arm, Patient Position: Sitting, Cuff Size: Standard)   Ht 5' 5" (1 651 m)   Wt 78 5 kg (173 lb)   LMP 2022 (Exact Date)   BMI 28 79 kg/m²   Pregravid Weight/BMI: 65 8 kg (145 lb) (BMI 24 13)  Current Weight: 78 5 kg (173 lb)   Total Weight Gain: 12 7 kg (28 lb)   Pre-Linnea Vitals    Flowsheet Row Most Recent Value   Prenatal Assessment    Fetal Heart Rate 135   Fundal Height (cm) 37 cm   Movement Present Prenatal Vitals    Blood Pressure 100/70   Weight - Scale 78 5 kg (173 lb)   Urine Albumin/Glucose    Dilation/Effacement/Station    Cervical Dilation 1 5   Cervical Effacement 30   Fetal Station -3   Vaginal Drainage    Draining Fluid No   Edema    LLE Edema None   RLE Edema None   Facial Edema None           General: Well appearing, no distress  Abdomen: Soft, gravid, nontender  Fundal Height: Appropriate for gestational age  Extremities: Warm and well perfused  Non tender

## 2023-02-24 NOTE — ASSESSMENT & PLAN NOTE
Continue to monitor for s/s of labor  Continue routine prenatal care  Return to office in 1 week for ob check  Repeat  scheduled 3/6/2023

## 2023-03-01 ENCOUNTER — ANESTHESIA EVENT (INPATIENT)
Dept: LABOR AND DELIVERY | Facility: HOSPITAL | Age: 33
End: 2023-03-01

## 2023-03-01 ENCOUNTER — ANESTHESIA (INPATIENT)
Dept: LABOR AND DELIVERY | Facility: HOSPITAL | Age: 33
End: 2023-03-01

## 2023-03-01 ENCOUNTER — TELEPHONE (OUTPATIENT)
Dept: LABOR AND DELIVERY | Facility: HOSPITAL | Age: 33
End: 2023-03-01

## 2023-03-01 ENCOUNTER — HOSPITAL ENCOUNTER (INPATIENT)
Facility: HOSPITAL | Age: 33
LOS: 2 days | Discharge: HOME | End: 2023-03-03
Attending: STUDENT IN AN ORGANIZED HEALTH CARE EDUCATION/TRAINING PROGRAM | Admitting: STUDENT IN AN ORGANIZED HEALTH CARE EDUCATION/TRAINING PROGRAM

## 2023-03-01 DIAGNOSIS — Z3A.38 38 WEEKS GESTATION OF PREGNANCY: Primary | ICD-10-CM

## 2023-03-01 PROBLEM — O42.00 PROM WITH ONSET OF LABOR WITHIN 24 HOURS OF RUPTURE: Status: ACTIVE | Noted: 2023-03-01

## 2023-03-01 LAB
ABO GROUP BLD: NORMAL
BASE EXCESS BLDCOA CALC-SCNC: -4.1 MMOL/L (ref 3–11)
BASE EXCESS BLDCOV CALC-SCNC: -0.9 MMOL/L (ref 1–9)
BLD GP AB SCN SERPL QL: NEGATIVE
ERYTHROCYTE [DISTWIDTH] IN BLOOD BY AUTOMATED COUNT: 14.3 % (ref 11.6–15.1)
ERYTHROCYTE [DISTWIDTH] IN BLOOD BY AUTOMATED COUNT: 14.4 % (ref 11.6–15.1)
HCO3 BLDCOA-SCNC: 23.1 MMOL/L (ref 17.3–27.3)
HCO3 BLDCOV-SCNC: 24.4 MMOL/L (ref 12.2–28.6)
HCT VFR BLD AUTO: 27.4 % (ref 34.8–46.1)
HCT VFR BLD AUTO: 39 % (ref 34.8–46.1)
HGB BLD-MCNC: 13.5 G/DL (ref 11.5–15.4)
HGB BLD-MCNC: 9.4 G/DL (ref 11.5–15.4)
MCH RBC QN AUTO: 30.3 PG (ref 26.8–34.3)
MCH RBC QN AUTO: 30.7 PG (ref 26.8–34.3)
MCHC RBC AUTO-ENTMCNC: 34.3 G/DL (ref 31.4–37.4)
MCHC RBC AUTO-ENTMCNC: 34.6 G/DL (ref 31.4–37.4)
MCV RBC AUTO: 88 FL (ref 82–98)
MCV RBC AUTO: 90 FL (ref 82–98)
O2 CT VFR BLDCOA CALC: 6.3 ML/DL
OXYHGB MFR BLDCOA: 28.8 %
OXYHGB MFR BLDCOV: 65 %
PCO2 BLDCOA: 50.5 MM[HG] (ref 30–60)
PCO2 BLDCOV: 42.9 MM HG (ref 27–43)
PH BLDCOA: 7.28 [PH] (ref 7.23–7.43)
PH BLDCOV: 7.37 [PH] (ref 7.19–7.49)
PLATELET # BLD AUTO: 103 THOUSANDS/UL (ref 149–390)
PLATELET # BLD AUTO: 141 THOUSANDS/UL (ref 149–390)
PMV BLD AUTO: 11.8 FL (ref 8.9–12.7)
PMV BLD AUTO: 12.1 FL (ref 8.9–12.7)
PO2 BLDCOA: 16.8 MM HG (ref 5–25)
PO2 BLDCOV: 26 MM HG (ref 15–45)
RBC # BLD AUTO: 3.06 MILLION/UL (ref 3.81–5.12)
RBC # BLD AUTO: 4.45 MILLION/UL (ref 3.81–5.12)
RH BLD: POSITIVE
SAO2 % BLDCOV: 13.7 ML/DL
SPECIMEN EXPIRATION DATE: NORMAL
TREPONEMA PALLIDUM IGG+IGM AB [PRESENCE] IN SERUM OR PLASMA BY IMMUNOASSAY: NORMAL
WBC # BLD AUTO: 10.94 THOUSAND/UL (ref 4.31–10.16)
WBC # BLD AUTO: 8.88 THOUSAND/UL (ref 4.31–10.16)

## 2023-03-01 PROCEDURE — 4A1HXCZ MONITORING OF PRODUCTS OF CONCEPTION, CARDIAC RATE, EXTERNAL APPROACH: ICD-10-PCS | Performed by: STUDENT IN AN ORGANIZED HEALTH CARE EDUCATION/TRAINING PROGRAM

## 2023-03-01 RX ORDER — BUPIVACAINE HYDROCHLORIDE 7.5 MG/ML
INJECTION, SOLUTION INTRASPINAL AS NEEDED
Status: DISCONTINUED | OUTPATIENT
Start: 2023-03-01 | End: 2023-03-01

## 2023-03-01 RX ORDER — OXYTOCIN/RINGER'S LACTATE 30/500 ML
PLASTIC BAG, INJECTION (ML) INTRAVENOUS CONTINUOUS PRN
Status: DISCONTINUED | OUTPATIENT
Start: 2023-03-01 | End: 2023-03-01

## 2023-03-01 RX ORDER — CEFAZOLIN SODIUM 1 G/50ML
1000 SOLUTION INTRAVENOUS ONCE
Status: DISCONTINUED | OUTPATIENT
Start: 2023-03-01 | End: 2023-03-01

## 2023-03-01 RX ORDER — KETOROLAC TROMETHAMINE 30 MG/ML
INJECTION, SOLUTION INTRAMUSCULAR; INTRAVENOUS AS NEEDED
Status: DISCONTINUED | OUTPATIENT
Start: 2023-03-01 | End: 2023-03-01

## 2023-03-01 RX ORDER — OXYTOCIN/RINGER'S LACTATE 30/500 ML
62.5 PLASTIC BAG, INJECTION (ML) INTRAVENOUS ONCE
Status: COMPLETED | OUTPATIENT
Start: 2023-03-01 | End: 2023-03-01

## 2023-03-01 RX ORDER — IBUPROFEN 600 MG/1
600 TABLET ORAL EVERY 6 HOURS
Status: DISCONTINUED | OUTPATIENT
Start: 2023-03-02 | End: 2023-03-03 | Stop reason: HOSPADM

## 2023-03-01 RX ORDER — CALCIUM CARBONATE 200(500)MG
1000 TABLET,CHEWABLE ORAL 3 TIMES DAILY PRN
Status: DISCONTINUED | OUTPATIENT
Start: 2023-03-01 | End: 2023-03-03 | Stop reason: HOSPADM

## 2023-03-01 RX ORDER — TRISODIUM CITRATE DIHYDRATE AND CITRIC ACID MONOHYDRATE 500; 334 MG/5ML; MG/5ML
30 SOLUTION ORAL ONCE
Status: COMPLETED | OUTPATIENT
Start: 2023-03-01 | End: 2023-03-01

## 2023-03-01 RX ORDER — DIPHENHYDRAMINE HYDROCHLORIDE 50 MG/ML
25 INJECTION INTRAMUSCULAR; INTRAVENOUS EVERY 6 HOURS PRN
Status: DISCONTINUED | OUTPATIENT
Start: 2023-03-01 | End: 2023-03-03 | Stop reason: HOSPADM

## 2023-03-01 RX ORDER — OXYTOCIN/RINGER'S LACTATE 30/500 ML
PLASTIC BAG, INJECTION (ML) INTRAVENOUS
Status: COMPLETED
Start: 2023-03-01 | End: 2023-03-01

## 2023-03-01 RX ORDER — SIMETHICONE 80 MG
80 TABLET,CHEWABLE ORAL 4 TIMES DAILY PRN
Status: DISCONTINUED | OUTPATIENT
Start: 2023-03-01 | End: 2023-03-03 | Stop reason: HOSPADM

## 2023-03-01 RX ORDER — METOCLOPRAMIDE HYDROCHLORIDE 5 MG/ML
10 INJECTION INTRAMUSCULAR; INTRAVENOUS EVERY 6 HOURS PRN
Status: DISCONTINUED | OUTPATIENT
Start: 2023-03-01 | End: 2023-03-03 | Stop reason: HOSPADM

## 2023-03-01 RX ORDER — CEFAZOLIN SODIUM 2 G/50ML
SOLUTION INTRAVENOUS AS NEEDED
Status: DISCONTINUED | OUTPATIENT
Start: 2023-03-01 | End: 2023-03-01

## 2023-03-01 RX ORDER — ONDANSETRON 2 MG/ML
4 INJECTION INTRAMUSCULAR; INTRAVENOUS ONCE
Status: COMPLETED | OUTPATIENT
Start: 2023-03-01 | End: 2023-03-01

## 2023-03-01 RX ORDER — SODIUM CHLORIDE, SODIUM LACTATE, POTASSIUM CHLORIDE, CALCIUM CHLORIDE 600; 310; 30; 20 MG/100ML; MG/100ML; MG/100ML; MG/100ML
125 INJECTION, SOLUTION INTRAVENOUS CONTINUOUS
Status: DISCONTINUED | OUTPATIENT
Start: 2023-03-01 | End: 2023-03-03 | Stop reason: HOSPADM

## 2023-03-01 RX ORDER — ONDANSETRON 2 MG/ML
4 INJECTION INTRAMUSCULAR; INTRAVENOUS EVERY 8 HOURS PRN
Status: DISCONTINUED | OUTPATIENT
Start: 2023-03-01 | End: 2023-03-01

## 2023-03-01 RX ORDER — OXYCODONE HYDROCHLORIDE 5 MG/1
10 TABLET ORAL EVERY 4 HOURS PRN
Status: DISCONTINUED | OUTPATIENT
Start: 2023-03-02 | End: 2023-03-03 | Stop reason: HOSPADM

## 2023-03-01 RX ORDER — ACETAMINOPHEN 325 MG/1
650 TABLET ORAL EVERY 4 HOURS PRN
Status: DISCONTINUED | OUTPATIENT
Start: 2023-03-01 | End: 2023-03-03 | Stop reason: HOSPADM

## 2023-03-01 RX ORDER — KETOROLAC TROMETHAMINE 30 MG/ML
30 INJECTION, SOLUTION INTRAMUSCULAR; INTRAVENOUS EVERY 6 HOURS SCHEDULED
Status: COMPLETED | OUTPATIENT
Start: 2023-03-01 | End: 2023-03-01

## 2023-03-01 RX ORDER — OXYCODONE HYDROCHLORIDE 5 MG/1
5 TABLET ORAL EVERY 4 HOURS PRN
Status: DISCONTINUED | OUTPATIENT
Start: 2023-03-02 | End: 2023-03-03 | Stop reason: HOSPADM

## 2023-03-01 RX ORDER — ONDANSETRON 2 MG/ML
4 INJECTION INTRAMUSCULAR; INTRAVENOUS EVERY 8 HOURS PRN
Status: DISCONTINUED | OUTPATIENT
Start: 2023-03-01 | End: 2023-03-03 | Stop reason: HOSPADM

## 2023-03-01 RX ORDER — NALOXONE HYDROCHLORIDE 0.4 MG/ML
0.1 INJECTION, SOLUTION INTRAMUSCULAR; INTRAVENOUS; SUBCUTANEOUS
Status: ACTIVE | OUTPATIENT
Start: 2023-03-01 | End: 2023-03-02

## 2023-03-01 RX ORDER — DOCUSATE SODIUM 100 MG/1
100 CAPSULE, LIQUID FILLED ORAL 2 TIMES DAILY PRN
Status: DISCONTINUED | OUTPATIENT
Start: 2023-03-01 | End: 2023-03-03 | Stop reason: HOSPADM

## 2023-03-01 RX ORDER — FENTANYL CITRATE/PF 50 MCG/ML
25 SYRINGE (ML) INJECTION
Status: DISCONTINUED | OUTPATIENT
Start: 2023-03-01 | End: 2023-03-03 | Stop reason: HOSPADM

## 2023-03-01 RX ORDER — MORPHINE SULFATE 1 MG/ML
INJECTION, SOLUTION EPIDURAL; INTRATHECAL; INTRAVENOUS AS NEEDED
Status: DISCONTINUED | OUTPATIENT
Start: 2023-03-01 | End: 2023-03-01

## 2023-03-01 RX ORDER — NALBUPHINE HCL 10 MG/ML
5 AMPUL (ML) INJECTION
Status: ACTIVE | OUTPATIENT
Start: 2023-03-01 | End: 2023-03-02

## 2023-03-01 RX ADMIN — SODIUM CHLORIDE, SODIUM LACTATE, POTASSIUM CHLORIDE, AND CALCIUM CHLORIDE 1000 ML: .6; .31; .03; .02 INJECTION, SOLUTION INTRAVENOUS at 15:31

## 2023-03-01 RX ADMIN — ONDANSETRON 4 MG: 2 INJECTION INTRAMUSCULAR; INTRAVENOUS at 09:28

## 2023-03-01 RX ADMIN — SODIUM CHLORIDE, SODIUM LACTATE, POTASSIUM CHLORIDE, AND CALCIUM CHLORIDE 125 ML/HR: .6; .31; .03; .02 INJECTION, SOLUTION INTRAVENOUS at 18:44

## 2023-03-01 RX ADMIN — SODIUM CHLORIDE, SODIUM LACTATE, POTASSIUM CHLORIDE, AND CALCIUM CHLORIDE 999 ML/HR: .6; .31; .03; .02 INJECTION, SOLUTION INTRAVENOUS at 17:40

## 2023-03-01 RX ADMIN — SERTRALINE HYDROCHLORIDE 50 MG: 50 TABLET ORAL at 08:42

## 2023-03-01 RX ADMIN — SODIUM CHLORIDE, SODIUM LACTATE, POTASSIUM CHLORIDE, AND CALCIUM CHLORIDE: .6; .31; .03; .02 INJECTION, SOLUTION INTRAVENOUS at 06:19

## 2023-03-01 RX ADMIN — SODIUM CHLORIDE, SODIUM LACTATE, POTASSIUM CHLORIDE, AND CALCIUM CHLORIDE: .6; .31; .03; .02 INJECTION, SOLUTION INTRAVENOUS at 05:28

## 2023-03-01 RX ADMIN — Medication 500 MG: at 05:57

## 2023-03-01 RX ADMIN — KETOROLAC TROMETHAMINE 30 MG: 30 INJECTION, SOLUTION INTRAMUSCULAR; INTRAVENOUS at 18:05

## 2023-03-01 RX ADMIN — KETOROLAC TROMETHAMINE 30 MG: 30 INJECTION, SOLUTION INTRAMUSCULAR; INTRAVENOUS at 23:56

## 2023-03-01 RX ADMIN — SODIUM CHLORIDE, SODIUM LACTATE, POTASSIUM CHLORIDE, AND CALCIUM CHLORIDE: .6; .31; .03; .02 INJECTION, SOLUTION INTRAVENOUS at 06:59

## 2023-03-01 RX ADMIN — Medication 62.5 UNITS: at 07:34

## 2023-03-01 RX ADMIN — SODIUM CITRATE AND CITRIC ACID MONOHYDRATE 30 ML: 500; 334 SOLUTION ORAL at 05:35

## 2023-03-01 RX ADMIN — SODIUM CHLORIDE, SODIUM LACTATE, POTASSIUM CHLORIDE, AND CALCIUM CHLORIDE 1000 ML: .6; .31; .03; .02 INJECTION, SOLUTION INTRAVENOUS at 05:00

## 2023-03-01 RX ADMIN — SODIUM CHLORIDE 5 MILLION UNITS: 0.9 INJECTION, SOLUTION INTRAVENOUS at 05:00

## 2023-03-01 RX ADMIN — KETOROLAC TROMETHAMINE 30 MG: 30 INJECTION, SOLUTION INTRAMUSCULAR; INTRAVENOUS at 12:18

## 2023-03-01 RX ADMIN — BUPIVACAINE HYDROCHLORIDE IN DEXTROSE 1.4 ML: 7.5 INJECTION, SOLUTION SUBARACHNOID at 05:51

## 2023-03-01 RX ADMIN — METOCLOPRAMIDE 10 MG: 5 INJECTION, SOLUTION INTRAMUSCULAR; INTRAVENOUS at 10:54

## 2023-03-01 RX ADMIN — KETOROLAC TROMETHAMINE 30 MG: 30 INJECTION, SOLUTION INTRAMUSCULAR; INTRAVENOUS at 06:21

## 2023-03-01 RX ADMIN — ACETAMINOPHEN 650 MG: 325 TABLET, FILM COATED ORAL at 11:09

## 2023-03-01 RX ADMIN — PHENYLEPHRINE HYDROCHLORIDE 50 MCG/MIN: 10 INJECTION, SOLUTION INTRAVENOUS at 05:55

## 2023-03-01 RX ADMIN — Medication 250 MILLI-UNITS/MIN: at 06:12

## 2023-03-01 RX ADMIN — MORPHINE SULFATE 0.2 MG: 1 INJECTION, SOLUTION EPIDURAL; INTRATHECAL; INTRAVENOUS at 05:51

## 2023-03-01 RX ADMIN — CEFAZOLIN SODIUM 2000 MG: 2 SOLUTION INTRAVENOUS at 05:56

## 2023-03-01 NOTE — TELEPHONE ENCOUNTER
Patient called on-call line stating that she believes her water broke  She is currently 38w3d gestational age  Reports that she woke up and her undergarments were drenched  She is elyssa every 8-10 minutes  Normal fetal movement  No bleeding  She is planning for repeat c/s due to history of 4th degree laceration  Patient instructed to proceed to L&D now for evaluation  L&D staff and [address] notified       Jorge Gordillo MD  3/1/2023 3:10 AM

## 2023-03-01 NOTE — ANESTHESIA POSTPROCEDURE EVALUATION
Post-Op Assessment Note    CV Status:  Stable  Pain Score: 0    Pain management: adequate     Mental Status:  Alert and awake   Hydration Status:  Stable   PONV Controlled:  None   Airway Patency:  Patent      Post Op Vitals Reviewed: Yes      Staff: Anesthesiologist, CRNA   Comments: vss        No notable events documented      BP   114/62   Temp      Pulse  66   Resp   20   SpO2   100

## 2023-03-01 NOTE — ANESTHESIA PROCEDURE NOTES
Spinal Block    Patient location during procedure: OR  Start time: 3/1/2023 5:52 AM  Staffing  Performed: Anesthesiologist   Anesthesiologist: Connie Petit DO  Preanesthetic Checklist  Completed: patient identified, IV checked, site marked, risks and benefits discussed, surgical consent, monitors and equipment checked, pre-op evaluation and timeout performed  Spinal Block  Patient position: sitting  Prep: ChloraPrep  Patient monitoring: heart rate, continuous pulse ox, frequent blood pressure checks and cardiac monitor  Approach: midline  Location: L3-4  Injection technique: single-shot  Needle  Needle type: pencil-tip   Needle gauge: 25 G  Needle length: 5 cm  Assessment  Sensory level: T4  Events: cerebrospinal fluid  Injection Assessment:  negative aspiration for heme, no paresthesia on injection and positive aspiration for clear CSF    Post-procedure:  site cleaned  Additional Notes  1 4 cc 0 75% bupiv with dextrose and 0 2 mg duramorph

## 2023-03-01 NOTE — ANESTHESIA PREPROCEDURE EVALUATION
Procedure:   SECTION () REPEAT (Uterus)    Relevant Problems   CARDIO   (+) Migraine without aura and without status migrainosus, not intractable      GYN   (+) 38 weeks gestation of pregnancy      NEURO/PSYCH   (+) Anxiety   (+) History of maternal fourth degree perineal laceration, currently pregnant   (+) History of migraine during pregnancy   (+) Migraine without aura and without status migrainosus, not intractable        Physical Exam    Airway    Mallampati score: II  TM Distance: >3 FB  Neck ROM: full     Dental   No notable dental hx     Cardiovascular      Pulmonary      Other Findings        Anesthesia Plan  ASA Score- 2     Anesthesia Type- spinal with ASA Monitors  Additional Monitors:   Airway Plan:     Comment: Spinal duramorph  Plan Factors-    Chart reviewed  Existing labs reviewed  Patient summary reviewed  Patient is not a current smoker  Induction-     Postoperative Plan- Plan for postoperative opioid use  Informed Consent- Anesthetic plan and risks discussed with patient  I personally reviewed this patient with the CRNA  Discussed and agreed on the Anesthesia Plan with the CRNA  Minoo Gan

## 2023-03-01 NOTE — OP NOTE
OPERATIVE REPORT  PATIENT NAME: Katiuska Cisneros    :  1990  MRN: [id number]  Pt Location: [facility] L&D OR ROOM 01    SURGERY DATE: 3/1/2023    Surgeon(s) and Role:     * Jaquan Salinas MD - Primary     * Fady Alvarez MD - Assisting    Pre-op Diagnosis:    1  Carnes pregnancy at 38w4d in vertex presentation   2  Premature rupture of membranes at term    Post-op Diagnosis: Same, delivered    Procedure: Repeat low-transverse  section via Pfannenstiel skin incision    Specimen(s):  ID Type Source Tests Collected by Time Destination   A :  Tissue (Placenta on Hold) OB Only Placenta PLACENTA IN STORAGE Jaquan Salinas MD 3/1/2023 1418    B :  Cord Blood Cord BLOOD GAS, VENOUS, CORD Jaquan Salinas MD 3/1/2023 0698    C :  Cord Blood Cord BLOOD GAS, ARTERIAL, CORD Jaquan Salinas MD 3/1/2023 2750        Quantitative blood loss: 532 mL    Drains:  Urethral Catheter Latex 14 Fr  (Active)   Number of days: 0     Anesthesia Type: Spinal    Operative Indications:   Katiuska Cisneros is a 34 y o   at 38w4d for repeat  section due to history of prior  delivery x 1 and prior 4th degree perineal laceration  Patient presented with spontaneous premature rupture of membranes at term  She was counseled regarding the option for trial of labor after  (TOLAC) versus proceeding with repeat  section, including risks and benefits of both  Patient declined TOLAC and requested to proceed with repeat  delivery, as she had previously planned  All risks, benefits, and alternatives were discussed with the patient  All questions were answered  The patient agreed to proceed with surgery  Shaheed Group Classification System:  No Multiple pregnancy, No Transverse or oblique lie, No Breech lie, Gestational age is > or =37 weeks, Multiparous, Previous uterine scar +  is SHAHEED GROUP 5    Operative Findings:  1   Live infant delivered from vertex position through clear fluid at 06:11, weight 3400 grams, Apgar scores of  8 and 9 at 1 and 5 minutes, respectively  No nuchal cord  2  Intact placenta delivered via fundal massage, 3-vessel cord noted  3  Dense scar tissue noted involving the subcutaneous tissue, fascia, and peritoneum  No intra-abdominal adhesions  4  Normal appearing bilateral fallopian tubes and ovaries  Complications: None    Procedure and Technique:    The patient was taken to the operating room, where she was placed under spinal anesthesia  The patient was then placed in supine position with a leftward tilt  She was prepped and draped in the normal sterile fashion  When anesthesia was found to be adequate, a Pfannenstiel skin incision was made with a scalpel and carried down to the underlying layer of fascia  The fascia was then incised with a scalpel and extended laterally and superiorly with curved Almazan scissors  The superior portion of the fascial incision was then grasped with two Kocher clamps, elevated, and  from the underlying rectus muscles with sharp and blunt dissection  Attention was then turned to the inferior aspect of the fascial incision, which in a similar manner, was grasped with two Kocher clamps, elevated, and  from the underlying rectus muscles using sharp and blunt dissection  The rectus muscles were then  in the midline, and the peritoneum was entered bluntly  The peritoneal incision was then extended superiorly, inferiorly, and laterally using blunt dissection and electrocautery with excellent visualization of the bladder  When adequate exposure had been achieved, the bladder blade was then placed  A low transverse uterine incision was then made with a scalpel  The uterus was entered bluntly and the hysterotomy was extended bluntly in a cephalad-caudad manner  The infant was then delivered in vertex presentation   After delayed cord clamping for 30 seconds, the cord was clamped and cut, and the infant was handed off to awaiting Neonatologist  Cord blood was collected and the placenta was delivered with fundal massage noted to be intact with 3-vessel cord  The uterus was then exteriorized and cleared of all clots and debris  The hysterotomy was then closed with 0-Vicryl suture in a running locked fashion  A second layer of the same suture was used in a imbricating fashion  A series of several figure-of-eight stitches were required along the length of the hysterotomy at the site of slow bleeding in order to obtain excellent hemostasis  The uterus was then returned to the abdomen, and the gutters were cleared of all clots and debris  The hysterotomy was inspected and noted to be hemostatic  A piece of SurgiFoam was placed along the hysterotomy to ensure continued hemostasis  Inspection of the fascia was performed with a small, 2 cm defect noted at the midline just superior to the incision line  The defect was closed using a single figure-of-eight stitch of 0-vicryl suture  The fascia was then closed with 0 Vicryl suture in a running fashion  The deep subcutaneous tissue was then irrigated and closed with 3-0 Vicryl suture in a running fashion  The skin was then closed with 4-0 Vicryl suture in subcuticular fashion  The patient tolerated the procedure well  Sponge, lap, and needle counts were correct x 3  The patient was taken to the Recovery Room in stable condition  I was present for the entire procedure  A qualified resident physician was not available   A co-surgeon was required because of skills and techniques relevant to speciality    Patient Disposition:  PACU     SIGNATURE: Bernabe Loyola MD  DATE: March 1, 2023  TIME: 7:38 AM

## 2023-03-01 NOTE — H&P
Ob/Gyn History and Physical  Gustavo Shoemaker 34 y o  female MRN: [id number]  Unit/Bed#: LD TRIAGE  Encounter: [id number]    A/P  34 y o   at 38w4d, here with SRoM/early labor  (1) SRoM/early labor  RoM at 0230  No evidence of chorio  Eloy irregularly, cervix 2cm dilated  --> Admit  Labs/toco/IVF   --> Delivery  (2) History of 4th degree laceration, prior  section  Options for delivery discussed with her in the outpatient setting, and she has elected repeat  section  She reaffirms that decision today  I discussed with her the nature of  section, and the risks, benefits, and alternatives to this procedure  The risks discussed included, but were not limited to, the risk of infection, the risk of bleeding, the risk of damage to intraabdominal organs including bowel, bladder, ureters, large vessels and nerves, the risk of uterine rupture in future pregnancies leading to fetal or maternal inury or death, the risk of abnormal placentation in future pregnancies leading to fetal or maternal injury or death, and the potential need for one or multiple future  deliveres  Following  our discussion of risks, benefits, and alternatives, all of her questions were answered, and she stated her strong desire to proceed  --> For repeat  section  Anesthesia, nursing mobilized  (3) GBS(+)/NKDA   --> Penicillin  (4) Anxiety  Symptoms controlled with Zoloft 50mg qDay   --> Continue outpatient regimen  (5) Elevated 1hrPG  Did not complete 3hrGTT  Had home glucose monitoring, but does not carry diagnosis of GDM     (6) Fetal status category II  Intermittent variable decels  Normal baseline, moderate variability, accels argue strongly against significant metabolic acidosis  --> LLP, fluids  --> Continuous fetal monitoring      Salvatore Montana MD  23  -------------------------------------------------------------------------------------------------------  CC/    "I think I broke my water "    HPI/    Awoke at 0230 with drenched undergarments; continued trickle since  Irregular contractions approx every 8-10"  No VB   (+)FM,    No F/Ch  Pregnancy complications/      Patient Active Problem List   Diagnosis   • History of maternal fourth degree perineal laceration, currently pregnant   • History of migraine during pregnancy   • 38 weeks gestation of pregnancy   • Maternal care due to low transverse uterine scar from previous  delivery   • Anxiety during pregnancy   • Abnormal maternal glucose tolerance, antepartum   • Yeast vaginitis   • Anxiety   • Asymptomatic bacteriuria during pregnancy   • Group B streptococcal infection during pregnancy   • Migraine without aura and without status migrainosus, not intractable       Allergies/      Allergies as of 2023 - Reviewed 2023   Allergen Reaction Noted   • Cat hair extract Rash 2021       Rx/      No current facility-administered medications on file prior to encounter       Current Outpatient Medications on File Prior to Encounter   Medication Sig Dispense Refill   • Prenatal Vit-Fe Fumarate-FA (PRENATAL VITAMINS PO) Take by mouth     • sertraline (ZOLOFT) 50 mg tablet Take 50 mg by mouth daily         PMH/      Past Medical History:   Diagnosis Date   • Abnormal glucose tolerance test (GTT) 2021    1 hr GTT: 159  3 hr GTT: 81  171  132  143 ( values elevated) - no GDM    • Anxiety     History of Zoloft; currently taking Lexapro 50 mg and managed by PCP   • Gonorrhea 2013   • History of fourth degree perineal laceration 2018    Requested elective LTCS 2021   • Migraines     managed by neuro; history of Imitrex   • Papanicolaou smear 2019    neg   • Pyelectasis of fetus on prenatal ultrasound 2021    Kiara ALVES 2021 10:19:55 EST > Noted at 20 week US  Plan for repeat US at 28 weeks  Morgan Oconnor 2021 11:09:03 EST >  Stable at 4-5mm bilaterally (Same measurements from 20 weeks)  MFM consult: "suggest notification of Pediatrics to ensure  evaluation of the urinary tract dilation occurs at 4-6 weeks of life"       PSH/      Past Surgical History:   Procedure Laterality Date   •  SECTION     • SKIN LESION EXCISION      birth sara removed   • VAGINAL DELIVERY  2018    female       ObHx/    :        OB History    Para Term  AB Living   3 2 2 0 0 2   SAB IAB Ectopic Multiple Live Births   0 0 0 0 2      # Outcome Date GA Lbr Ziggy/2nd Weight Sex Delivery Anes PTL Lv   3 Current            2 Term 21 38w4d  3856 g (8 lb 8 oz) M CS-LTranv Spinal  MARIA ISABEL   1 Term 18 40w4d  3600 g (7 lb 15 oz) F Vag-Vacuum EPI  MARIA ISABEL      Obstetric Comments   Menarche age 14       GynHx/    There is no history of sexually-transmitted infections  She is sexually active with male partners  She has had 1 sexual partner(s) in the past 12 months  FH/    There is no family history of birth defects  Family History   Problem Relation Age of Onset   • Hyperlipidemia Father    • No Known Problems Daughter    • Thrombosis Maternal Grandmother    • Stroke Maternal Grandmother    • Osteoporosis Maternal Grandmother    • Diabetes Maternal Grandfather    • Hyperlipidemia Maternal Grandfather    • Hyperlipidemia Paternal Grandmother    • Hyperlipidemia Paternal Grandfather    • Cancer Paternal Grandfather         intestional   • Skin cancer Paternal Grandfather    • Lung cancer Paternal Grandfather        SH/    She is   She works as a   She does not use tobacco, alcohol, or illicit drugs          Social History     Socioeconomic History   • Marital status: /Civil Union     Spouse name: Not on file   • Number of children: Not on file   • Years of education: Masters degree   • Highest education level: Not on file   Occupational History   • Occupation: Teacher   Tobacco Use   • Smoking status: Never   • Smokeless tobacco: Never   Vaping Use   • Vaping Use: Never used   Substance and Sexual Activity   • Alcohol use: Not Currently     Comment: socially   • Drug use: Never     Comment: No use   • Sexual activity: Yes     Partners: Male     Birth control/protection: None     Comment: no new partners in past year   Other Topics Concern   • Not on file   Social History Narrative    Walking 1-2x per week     Social Determinants of Health     Financial Resource Strain: Not on file   Food Insecurity: Not on file   Transportation Needs: Not on file   Physical Activity: Not on file   Stress: Not on file   Social Connections: Not on file   Intimate Partner Violence: Not on file   Housing Stability: Not on file       RoS/    Constitutional: Negative    CV: Negative    Pulm: Negative    GI: Negative    Urinary: Negative    Neuro: Negative    Musculoskeletal: Negative    O/  /79 (BP Location: Left arm)   Pulse 94   Temp 97 5 °F (36 4 °C) (Tympanic)   Resp 18   Ht 5' 5" (1 651 m)   Wt 78 5 kg (173 lb)   LMP 06/04/2022 (Exact Date)   SpO2 99%   BMI 28 79 kg/m²     Alert, comfortable, no acute distress    Regular rate and Rhythm    Clear to auscultation bilaterally    Abdomen soft, nontender, nondistended  Fundus nontender, size consistent with dates      Cephalic      EFW 0557    Gyn/      Normal female external genitalia  Vault well-estrogenized, well-supported  (+)Pooling      (+)Nitrazine      (+)Ferning      Pelvis adequate/gynecoid  Cervix:           Dilation: 2cm         Effacement: 50%         Station: -3  Consistency: Medium         Position: Posterior   Presentation: Vertex    FHR: 140 moderate variability   (+) accels, intermittent variable decels      [place]:  Irregular q4-8" minutes    Prenatal labs/  Lab Results   Component Value Date    ABO O 09/26/2022     Positive 09/26/2022    ABS Normal 09/26/2022    HGB 12 0 01/31/2023     (L) 01/31/2023    EXTRUBELIGGQ 3 65 09/26/2022    RPR Non Reactive 12/17/2022    HEPBSAG neg 09/26/2022    HEPCAB <0 1 09/26/2022    HIVAGAB Non-Reactive 09/26/2022    GC neg 08/17/2022    [id number] 148 (H) 12/17/2022     GBS(+)

## 2023-03-01 NOTE — PLAN OF CARE
Problem: ANTEPARTUM  Goal: Maintain pregnancy as long as maternal and/or fetal condition is stable  Description: INTERVENTIONS:  - Maternal surveillance  - Fetal surveillance  - Monitor uterine activity  - Medications as ordered  - Bedrest  Outcome: Completed     Problem: BIRTH - VAGINAL/ SECTION  Goal: Fetal and maternal status remain reassuring during the birth process  Description: INTERVENTIONS:  - Monitor vital signs  - Monitor fetal heart rate  - Monitor uterine activity  - Monitor labor progression (vaginal delivery)  - DVT prophylaxis  - Antibiotic prophylaxis  Outcome: Completed  Goal: Emotionally satisfying birthing experience for mother/fetus  Description: Interventions:  - Assess, plan, implement and evaluate the nursing care given to the patient in labor  - Advocate the philosophy that each childbirth experience is a unique experience and support the family's chosen level of involvement and control during the labor process   - Actively participate in both the patient's and family's teaching of the birth process  - Consider cultural, Scientologist and age-specific factors and plan care for the patient in labor  Outcome: Completed

## 2023-03-01 NOTE — PLAN OF CARE
Problem: PAIN - ADULT  Goal: Verbalizes/displays adequate comfort level or baseline comfort level  Description: Interventions:  - Encourage patient to monitor pain and request assistance  - Assess pain using appropriate pain scale  - Administer analgesics based on type and severity of pain and evaluate response  - Implement non-pharmacological measures as appropriate and evaluate response  - Consider cultural and social influences on pain and pain management  - Notify physician/advanced practitioner if interventions unsuccessful or patient reports new pain  Outcome: Progressing     Problem: INFECTION - ADULT  Goal: Absence or prevention of progression during hospitalization  Description: INTERVENTIONS:  - Assess and monitor for signs and symptoms of infection  - Monitor lab/diagnostic results  - Monitor all insertion sites, i e  indwelling lines, tubes, and drains  - Monitor endotracheal if appropriate and nasal secretions for changes in amount and color  - Roberts appropriate cooling/warming therapies per order  - Administer medications as ordered  - Instruct and encourage patient and family to use good hand hygiene technique  - Identify and instruct in appropriate isolation precautions for identified infection/condition  Outcome: Progressing  Goal: Absence of fever/infection during neutropenic period  Description: INTERVENTIONS:  - Monitor WBC    Outcome: Progressing     Problem: SAFETY ADULT  Goal: Patient will remain free of falls  Description: INTERVENTIONS:  - Educate patient/family on patient safety including physical limitations  - Instruct patient to call for assistance with activity   - Consult OT/PT to assist with strengthening/mobility   - Keep Call bell within reach  - Keep bed low and locked with side rails adjusted as appropriate  - Keep care items and personal belongings within reach  - Initiate and maintain comfort rounds  - Make Fall Risk Sign visible to staff  - Offer Toileting every  Hours, in advance of need  - Initiate/Maintain alarm  - Obtain necessary fall risk management equipment:   - Apply yellow socks and bracelet for high fall risk patients  - Consider moving patient to room near nurses station  Outcome: Progressing  Goal: Maintain or return to baseline ADL function  Description: INTERVENTIONS:  -  Assess patient's ability to carry out ADLs; assess patient's baseline for ADL function and identify physical deficits which impact ability to perform ADLs (bathing, care of mouth/teeth, toileting, grooming, dressing, etc )  - Assess/evaluate cause of self-care deficits   - Assess range of motion  - Assess patient's mobility; develop plan if impaired  - Assess patient's need for assistive devices and provide as appropriate  - Encourage maximum independence but intervene and supervise when necessary  - Involve family in performance of ADLs  - Assess for home care needs following discharge   - Consider OT consult to assist with ADL evaluation and planning for discharge  - Provide patient education as appropriate  Outcome: Progressing  Goal: Maintains/Returns to pre admission functional level  Description: INTERVENTIONS:  - Perform BMAT or MOVE assessment daily    - Set and communicate daily mobility goal to care team and patient/family/caregiver  - Collaborate with rehabilitation services on mobility goals if consulted  - Perform Range of Motion  times a day  - Reposition patient every  hours    - Dangle patient  times a day  - Stand patient  times a day  - Ambulate patient  times a day  - Out of bed to chair  times a day   - Out of bed for meals  times a day  - Out of bed for toileting  - Record patient progress and toleration of activity level   Outcome: Progressing     Problem: DISCHARGE PLANNING  Goal: Discharge to home or other facility with appropriate resources  Description: INTERVENTIONS:  - Identify barriers to discharge w/patient and caregiver  - Arrange for needed discharge resources and transportation as appropriate  - Identify discharge learning needs (meds, wound care, etc )  - Arrange for interpretive services to assist at discharge as needed  - Refer to Case Management Department for coordinating discharge planning if the patient needs post-hospital services based on physician/advanced practitioner order or complex needs related to functional status, cognitive ability, or social support system  Outcome: Progressing     Problem: Knowledge Deficit  Goal: Patient/family/caregiver demonstrates understanding of disease process, treatment plan, medications, and discharge instructions  Description: Complete learning assessment and assess knowledge base    Interventions:  - Provide teaching at level of understanding  - Provide teaching via preferred learning methods  Outcome: Progressing     Problem: ANTEPARTUM  Goal: Maintain pregnancy as long as maternal and/or fetal condition is stable  Description: INTERVENTIONS:  - Maternal surveillance  - Fetal surveillance  - Monitor uterine activity  - Medications as ordered  - Bedrest  Outcome: Progressing     Problem: BIRTH - VAGINAL/ SECTION  Goal: Fetal and maternal status remain reassuring during the birth process  Description: INTERVENTIONS:  - Monitor vital signs  - Monitor fetal heart rate  - Monitor uterine activity  - Monitor labor progression (vaginal delivery)  - DVT prophylaxis  - Antibiotic prophylaxis  Outcome: Progressing  Goal: Emotionally satisfying birthing experience for mother/fetus  Description: Interventions:  - Assess, plan, implement and evaluate the nursing care given to the patient in labor  - Advocate the philosophy that each childbirth experience is a unique experience and support the family's chosen level of involvement and control during the labor process   - Actively participate in both the patient's and family's teaching of the birth process  - Consider cultural, Jew and age-specific factors and plan care for the patient in labor  Outcome: Progressing

## 2023-03-02 PROBLEM — D69.6 THROMBOCYTOPENIA AFFECTING PREGNANCY (HCC): Status: ACTIVE | Noted: 2023-03-02

## 2023-03-02 PROBLEM — O99.119 THROMBOCYTOPENIA AFFECTING PREGNANCY (HCC): Status: ACTIVE | Noted: 2023-03-02

## 2023-03-02 LAB
ALBUMIN SERPL BCP-MCNC: 2.7 G/DL (ref 3.5–5)
ALP SERPL-CCNC: 103 U/L (ref 34–104)
ALT SERPL W P-5'-P-CCNC: 9 U/L (ref 7–52)
ANION GAP SERPL CALCULATED.3IONS-SCNC: 5 MMOL/L (ref 4–13)
AST SERPL W P-5'-P-CCNC: 17 U/L (ref 13–39)
BASOPHILS # BLD AUTO: 0.03 THOUSANDS/ÂΜL (ref 0–0.1)
BASOPHILS NFR BLD AUTO: 0 % (ref 0–1)
BILIRUB SERPL-MCNC: 0.51 MG/DL (ref 0.2–1)
BUN SERPL-MCNC: 5 MG/DL (ref 5–25)
CALCIUM ALBUM COR SERPL-MCNC: 8.9 MG/DL (ref 8.3–10.1)
CALCIUM SERPL-MCNC: 7.9 MG/DL (ref 8.4–10.2)
CHLORIDE SERPL-SCNC: 106 MMOL/L (ref 96–108)
CO2 SERPL-SCNC: 25 MMOL/L (ref 21–32)
CREAT SERPL-MCNC: 0.52 MG/DL (ref 0.6–1.3)
EOSINOPHIL # BLD AUTO: 0.12 THOUSAND/ÂΜL (ref 0–0.61)
EOSINOPHIL NFR BLD AUTO: 2 % (ref 0–6)
ERYTHROCYTE [DISTWIDTH] IN BLOOD BY AUTOMATED COUNT: 14.6 % (ref 11.6–15.1)
GFR SERPL CREATININE-BSD FRML MDRD: 125 ML/MIN/1.73SQ M
GLUCOSE SERPL-MCNC: 86 MG/DL (ref 65–140)
HCT VFR BLD AUTO: 28.2 % (ref 34.8–46.1)
HGB BLD-MCNC: 9.7 G/DL (ref 11.5–15.4)
IMM GRANULOCYTES # BLD AUTO: 0.11 THOUSAND/UL (ref 0–0.2)
IMM GRANULOCYTES NFR BLD AUTO: 1 % (ref 0–2)
LYMPHOCYTES # BLD AUTO: 0.76 THOUSANDS/ÂΜL (ref 0.6–4.47)
LYMPHOCYTES NFR BLD AUTO: 10 % (ref 14–44)
MCH RBC QN AUTO: 31.2 PG (ref 26.8–34.3)
MCHC RBC AUTO-ENTMCNC: 34.4 G/DL (ref 31.4–37.4)
MCV RBC AUTO: 91 FL (ref 82–98)
MONOCYTES # BLD AUTO: 0.56 THOUSAND/ÂΜL (ref 0.17–1.22)
MONOCYTES NFR BLD AUTO: 7 % (ref 4–12)
NEUTROPHILS # BLD AUTO: 6.03 THOUSANDS/ÂΜL (ref 1.85–7.62)
NEUTS SEG NFR BLD AUTO: 80 % (ref 43–75)
NRBC BLD AUTO-RTO: 0 /100 WBCS
PLATELET # BLD AUTO: 103 THOUSANDS/UL (ref 149–390)
PMV BLD AUTO: 11.8 FL (ref 8.9–12.7)
POTASSIUM SERPL-SCNC: 4.1 MMOL/L (ref 3.5–5.3)
PROT SERPL-MCNC: 5 G/DL (ref 6.4–8.4)
RBC # BLD AUTO: 3.11 MILLION/UL (ref 3.81–5.12)
SODIUM SERPL-SCNC: 136 MMOL/L (ref 135–147)
WBC # BLD AUTO: 7.61 THOUSAND/UL (ref 4.31–10.16)

## 2023-03-02 RX ADMIN — IBUPROFEN 600 MG: 600 TABLET, FILM COATED ORAL at 19:26

## 2023-03-02 RX ADMIN — IBUPROFEN 600 MG: 600 TABLET, FILM COATED ORAL at 06:59

## 2023-03-02 RX ADMIN — ACETAMINOPHEN 650 MG: 325 TABLET, FILM COATED ORAL at 10:22

## 2023-03-02 RX ADMIN — ACETAMINOPHEN 650 MG: 325 TABLET, FILM COATED ORAL at 14:31

## 2023-03-02 RX ADMIN — ACETAMINOPHEN 650 MG: 325 TABLET, FILM COATED ORAL at 19:26

## 2023-03-02 RX ADMIN — IBUPROFEN 600 MG: 600 TABLET, FILM COATED ORAL at 12:36

## 2023-03-02 RX ADMIN — ACETAMINOPHEN 650 MG: 325 TABLET, FILM COATED ORAL at 23:38

## 2023-03-02 RX ADMIN — SERTRALINE HYDROCHLORIDE 50 MG: 50 TABLET ORAL at 09:51

## 2023-03-02 NOTE — LACTATION NOTE
This note was copied from a baby's chart  Met with mother to follow up  Mother discussed that she feels that she can have deeper latches, reviewed breast compression to allow baby to get on deeper as well as importance to bring him in chin first to get an asymmetrical latch  Baby came back from a circumcision and was inconsolable during encounter  He would latch and mother was able to practice, but remain on breast for comfort rather than feed  He was given 8 large drops of expressed breast milk that is transitioning  In football position, he fall asleep  Mother was encouraged to leave him rest and keep close for next feeding  Primary Rn made aware

## 2023-03-02 NOTE — PROGRESS NOTES
Progress Note - OB/GYN  Post-Partum Physician Note   Freddy Jo 34 y o  female MRN: [id number]  Unit/Bed#:  209- Encounter: [id number]    Patient is postop and postpartum day 1 from a  (repeat) with spinal anesthesia  Subjective:   Pain: controlled  Tolerating Oral Intake: yes  Voiding: no  Flatus: yes  Bowel Movement: no  Ambulating: yes  Breastfeeding: Breastfeeding  Shortness of Breath: no  Leg Pain/Discomfort: no  Lochia: normal      Objective:   Vitals:    23 1930 23 2300 23 0300 23 0721   BP: 107/67 110/68 105/63 109/70   BP Location: Left arm Left arm Left arm Left arm   Pulse: 77 78 66 74   Resp: 18 18 18 18   Temp: 98 6 °F (37 °C) 98 9 °F (37 2 °C) 98 4 °F (36 9 °C) 98 8 °F (37 1 °C)   TempSrc: Oral Oral Oral Oral   SpO2: 99% 99% 98% 97%   Weight:       Height:           Intake/Output Summary (Last 24 hours) at 3/2/2023 1031  Last data filed at 3/2/2023 0805  Gross per 24 hour   Intake 2303 1 ml   Output 1475 ml   Net 828 1 ml       Physical Exam:  General: in no apparent distress  Abdomen: abdomen is soft without significant tenderness  Fundus: Firm and non-tender, 2 below the umbilicus  Incision: C/D/I  Lower extremities: nontender      Labs/Tests:   Lab Results   Component Value Date    WBC 7 61 2023    HGB 9 7 (L) 2023    HCT 28 2 (L) 2023    MCV 91 2023     (L) 2023       Brief OB Lab review:  ABO Grouping   Date Value Ref Range Status   2023 O  Final      Rh Factor   Date Value Ref Range Status   2023 Positive  Final     Rh Type   Date Value Ref Range Status   2022 Positive  Final     Comment:     Please note: Prior records for this patient's ABO / Rh type are not  available for additional verification        No results found for: ANTIBODYSCR  External Rubella IGG Quantitation   Date Value Ref Range Status   2022 3 65  Final         MEDS:   Current Facility-Administered Medications   Medication Dose Route Frequency   • acetaminophen (TYLENOL) tablet 650 mg  650 mg Oral Q4H PRN   • calcium carbonate (TUMS) chewable tablet 1,000 mg  1,000 mg Oral TID PRN   • diphenhydrAMINE (BENADRYL) injection 25 mg  25 mg Intravenous Q6H PRN   • docusate sodium (COLACE) capsule 100 mg  100 mg Oral BID PRN   • fentaNYL (SUBLIMAZE) injection 25 mcg  25 mcg Intravenous Q5 Min PRN   • ibuprofen (MOTRIN) tablet 600 mg  600 mg Oral Q6H   • lactated ringers infusion  125 mL/hr Intravenous Continuous   • metoclopramide (REGLAN) injection 10 mg  10 mg Intravenous Q6H PRN   • ondansetron (ZOFRAN) injection 4 mg  4 mg Intravenous Q8H PRN   • oxyCODONE (ROXICODONE) IR tablet 10 mg  10 mg Oral Q4H PRN   • oxyCODONE (ROXICODONE) IR tablet 5 mg  5 mg Oral Q4H PRN   • sertraline (ZOLOFT) tablet 50 mg  50 mg Oral Daily   • simethicone (MYLICON) chewable tablet 80 mg  80 mg Oral 4x Daily PRN     Invasive Devices     Peripheral Intravenous Line  Duration           Peripheral IV 23 Dorsal (posterior); Right Hand 1 day                Assessment and Plan:  34y o  year-old , postpartum day 1 status-post  , Low Transverse   Continue routine postpartum care  Encourage ambulation  VTE ppx: postop SDCs, ambulation (Postpartum VTE risk score of 1)  Planning discharge 1-2 days  Unable to void post   - Patient unable to void 8 hours after manzo removed, straight cath'd for 700cc  She is taking adequate PO  Will given her another 4 hours per protocol to void and then bladder scan again to see if needs cath  - she states this happened after her last  and she had to have manzo reinserted for 24 hours for bladder rest and was able to void at that time       - reviewed protocol is patient can be straight cath'd up to 4 times if volume on scan > 400cc, I reviewed with nurse and patient, given her prior experience, if she is still not able to void and volume >400 at the 4 hour sara, we could just replace manzo for 24 hours to allow bladder rest       Anxiety during pregnancy  - continue Zoloft postpartum    Thrombocytopenia affecting pregnancy ([facility] [address] )   - plt 103K, stable from last night   - consistent with known gestational thrombocytopenia      Peter Corcoran MD

## 2023-03-02 NOTE — PROGRESS NOTES
Patient is post-op day 0 from a Repeat Low Transverse  delivery  Came to see patient for low urine output  UOP 20mL for the last hour  Has received to 1 L IVF boluses  She reports feeling well  Pain is well controlled  She is sitting up in bed  She denies lightheadedness  She is tolerating a regular diet  Nurse notes that vaginal bleeding has been WNL  Objective:   Vitals:    23   BP: 107/67   Pulse: 77   Resp: 18   Temp: 98 6 °F (37 °C)   SpO2: 99%       Intake/Output Summary (Last 24 hours) at 3/1/2023 1957  Last data filed at 3/1/2023 1930  Gross per 24 hour   Intake 5183 76 ml   Output 888 ml   Net 4295 76 ml   [place] draining clear urine    Physical Exam:  General: pleasant, alert, in no apparent distress, non-toxic  Lungs: normal effort  Abdomen: soft, no distension, no rebound tenderness, no guarding or rigidity  Fundus: Firm, 1 below the umbilicus  Incision: covered and dressing is dry      Assessment and Plan:  34y o  year-old , postoperative day 0 status-post Repeat Low Transverse  delivery with low UOP  Her exam is benign  Vitals stable  Low suspicion for ongoing bleeding and suspect she is dry  Will check CBC  Give IVF bolus  Continue to monitor UOP and vitals         Jewels Oliver MD

## 2023-03-02 NOTE — PLAN OF CARE
Problem: PAIN - ADULT  Goal: Verbalizes/displays adequate comfort level or baseline comfort level  Description: Interventions:  - Encourage patient to monitor pain and request assistance  - Assess pain using appropriate pain scale  - Administer analgesics based on type and severity of pain and evaluate response  - Implement non-pharmacological measures as appropriate and evaluate response  - Consider cultural and social influences on pain and pain management  - Notify physician/advanced practitioner if interventions unsuccessful or patient reports new pain  Outcome: Progressing     Problem: INFECTION - ADULT  Goal: Absence or prevention of progression during hospitalization  Description: INTERVENTIONS:  - Assess and monitor for signs and symptoms of infection  - Monitor lab/diagnostic results  - Monitor all insertion sites, i e  indwelling lines, tubes, and drains  - Monitor endotracheal if appropriate and nasal secretions for changes in amount and color  - [place] appropriate cooling/warming therapies per order  - Administer medications as ordered  - Instruct and encourage patient and family to use good hand hygiene technique  - Identify and instruct in appropriate isolation precautions for identified infection/condition  Outcome: Progressing  Goal: Absence of fever/infection during neutropenic period  Description: INTERVENTIONS:  - Monitor WBC    Outcome: Progressing     Problem: SAFETY ADULT  Goal: Patient will remain free of falls  Description: INTERVENTIONS:  - Educate patient/family on patient safety including physical limitations  - Instruct patient to call for assistance with activity   - Consult OT/PT to assist with strengthening/mobility   - Keep Call bell within reach  - Keep bed low and locked with side rails adjusted as appropriate  - Keep care items and personal belongings within reach  - Initiate and maintain comfort rounds  - Make Fall Risk Sign visible to staff  - Apply yellow socks and bracelet for high fall risk patients  - Consider moving patient to room near nurses station  Outcome: Progressing  Goal: Maintain or return to baseline ADL function  Description: INTERVENTIONS:  -  Assess patient's ability to carry out ADLs; assess patient's baseline for ADL function and identify physical deficits which impact ability to perform ADLs (bathing, care of mouth/teeth, toileting, grooming, dressing, etc )  - Assess/evaluate cause of self-care deficits   - Assess range of motion  - Assess patient's mobility; develop plan if impaired  - Assess patient's need for assistive devices and provide as appropriate  - Encourage maximum independence but intervene and supervise when necessary  - Involve family in performance of ADLs  - Assess for home care needs following discharge   - Consider OT consult to assist with ADL evaluation and planning for discharge  - Provide patient education as appropriate  Outcome: Progressing  Goal: Maintains/Returns to pre admission functional level  Description: INTERVENTIONS:  - Perform BMAT or MOVE assessment daily    - Set and communicate daily mobility goal to care team and patient/family/caregiver     - Collaborate with rehabilitation services on mobility goals if consulted  - Out of bed for toileting  - Record patient progress and toleration of activity level   Outcome: Progressing     Problem: DISCHARGE PLANNING  Goal: Discharge to home or other facility with appropriate resources  Description: INTERVENTIONS:  - Identify barriers to discharge w/patient and caregiver  - Arrange for needed discharge resources and transportation as appropriate  - Identify discharge learning needs (meds, wound care, etc )  - Arrange for interpretive services to assist at discharge as needed  - Refer to Case Management Department for coordinating discharge planning if the patient needs post-hospital services based on physician/advanced practitioner order or complex needs related to functional status, cognitive ability, or social support system  Outcome: Progressing     Problem: Knowledge Deficit  Goal: Patient/family/caregiver demonstrates understanding of disease process, treatment plan, medications, and discharge instructions  Description: Complete learning assessment and assess knowledge base    Interventions:  - Provide teaching at level of understanding  - Provide teaching via preferred learning methods  Outcome: Progressing     Problem: POSTPARTUM  Goal: Experiences normal postpartum course  Description: INTERVENTIONS:  - Monitor maternal vital signs  - Assess uterine involution and lochia  Outcome: Progressing  Goal: Appropriate maternal -  bonding  Description: INTERVENTIONS:  - Identify family support  - Assess for appropriate maternal/infant bonding   -Encourage maternal/infant bonding opportunities  - Referral to  or  as needed  Outcome: Progressing  Goal: Establishment of infant feeding pattern  Description: INTERVENTIONS:  - Assess breast/bottle feeding  - Refer to lactation as needed  Outcome: Progressing  Goal: Incision(s), wounds(s) or drain site(s) healing without S/S of infection  Description: INTERVENTIONS  - Assess and document dressing, incision, wound bed, drain sites and surrounding tissue  - Provide patient and family education  Outcome: Progressing

## 2023-03-02 NOTE — PLAN OF CARE
Problem: PAIN - ADULT  Goal: Verbalizes/displays adequate comfort level or baseline comfort level  Description: Interventions:  - Encourage patient to monitor pain and request assistance  - Assess pain using appropriate pain scale  - Administer analgesics based on type and severity of pain and evaluate response  - Implement non-pharmacological measures as appropriate and evaluate response  - Consider cultural and social influences on pain and pain management  - Notify physician/advanced practitioner if interventions unsuccessful or patient reports new pain  Outcome: Progressing     Problem: INFECTION - ADULT  Goal: Absence or prevention of progression during hospitalization  Description: INTERVENTIONS:  - Assess and monitor for signs and symptoms of infection  - Monitor lab/diagnostic results  - Monitor all insertion sites, i e  indwelling lines, tubes, and drains  - Monitor endotracheal if appropriate and nasal secretions for changes in amount and color  - [place] appropriate cooling/warming therapies per order  - Administer medications as ordered  - Instruct and encourage patient and family to use good hand hygiene technique  - Identify and instruct in appropriate isolation precautions for identified infection/condition  Outcome: Progressing  Goal: Absence of fever/infection during neutropenic period  Description: INTERVENTIONS:  - Monitor WBC    Outcome: Progressing     Problem: SAFETY ADULT  Goal: Patient will remain free of falls  Description: INTERVENTIONS:  - Educate patient/family on patient safety including physical limitations  - Instruct patient to call for assistance with activity   - Consult OT/PT to assist with strengthening/mobility   - Keep Call bell within reach  - Keep bed low and locked with side rails adjusted as appropriate  - Keep care items and personal belongings within reach  - Initiate and maintain comfort rounds  - Make Fall Risk Sign visible to staff  - Apply yellow socks and bracelet for high fall risk patients  - Consider moving patient to room near nurses station  Outcome: Progressing  Goal: Maintain or return to baseline ADL function  Description: INTERVENTIONS:  -  Assess patient's ability to carry out ADLs; assess patient's baseline for ADL function and identify physical deficits which impact ability to perform ADLs (bathing, care of mouth/teeth, toileting, grooming, dressing, etc )  - Assess/evaluate cause of self-care deficits   - Assess range of motion  - Assess patient's mobility; develop plan if impaired  - Assess patient's need for assistive devices and provide as appropriate  - Encourage maximum independence but intervene and supervise when necessary  - Involve family in performance of ADLs  - Assess for home care needs following discharge   - Consider OT consult to assist with ADL evaluation and planning for discharge  - Provide patient education as appropriate  Outcome: Progressing  Goal: Maintains/Returns to pre admission functional level  Description: INTERVENTIONS:  - Perform BMAT or MOVE assessment daily    - Set and communicate daily mobility goal to care team and patient/family/caregiver     - Collaborate with rehabilitation services on mobility goals if consulted  - Out of bed for toileting  - Record patient progress and toleration of activity level   Outcome: Progressing     Problem: DISCHARGE PLANNING  Goal: Discharge to home or other facility with appropriate resources  Description: INTERVENTIONS:  - Identify barriers to discharge w/patient and caregiver  - Arrange for needed discharge resources and transportation as appropriate  - Identify discharge learning needs (meds, wound care, etc )  - Arrange for interpretive services to assist at discharge as needed  - Refer to Case Management Department for coordinating discharge planning if the patient needs post-hospital services based on physician/advanced practitioner order or complex needs related to functional status, cognitive ability, or social support system  Outcome: Progressing     Problem: Knowledge Deficit  Goal: Patient/family/caregiver demonstrates understanding of disease process, treatment plan, medications, and discharge instructions  Description: Complete learning assessment and assess knowledge base    Interventions:  - Provide teaching at level of understanding  - Provide teaching via preferred learning methods  Outcome: Progressing     Problem: POSTPARTUM  Goal: Experiences normal postpartum course  Description: INTERVENTIONS:  - Monitor maternal vital signs  - Assess uterine involution and lochia  Outcome: Progressing  Goal: Appropriate maternal -  bonding  Description: INTERVENTIONS:  - Identify family support  - Assess for appropriate maternal/infant bonding   -Encourage maternal/infant bonding opportunities  - Referral to  or  as needed  Outcome: Progressing  Goal: Establishment of infant feeding pattern  Description: INTERVENTIONS:  - Assess breast/bottle feeding  - Refer to lactation as needed  Outcome: Progressing  Goal: Incision(s), wounds(s) or drain site(s) healing without S/S of infection  Description: INTERVENTIONS  - Assess and document dressing, incision, wound bed, drain sites and surrounding tissue  - Provide patient and family education  Outcome: Progressing

## 2023-03-03 VITALS
HEIGHT: 65 IN | HEART RATE: 83 BPM | DIASTOLIC BLOOD PRESSURE: 87 MMHG | SYSTOLIC BLOOD PRESSURE: 133 MMHG | WEIGHT: 173 LBS | RESPIRATION RATE: 16 BRPM | BODY MASS INDEX: 28.82 KG/M2 | OXYGEN SATURATION: 98 % | TEMPERATURE: 98.1 F

## 2023-03-03 RX ADMIN — DOCUSATE SODIUM 100 MG: 100 CAPSULE, LIQUID FILLED ORAL at 08:06

## 2023-03-03 RX ADMIN — SERTRALINE HYDROCHLORIDE 50 MG: 50 TABLET ORAL at 08:07

## 2023-03-03 RX ADMIN — IBUPROFEN 600 MG: 600 TABLET, FILM COATED ORAL at 08:07

## 2023-03-03 RX ADMIN — IBUPROFEN 600 MG: 600 TABLET, FILM COATED ORAL at 01:30

## 2023-03-03 NOTE — PLAN OF CARE
Problem: PAIN - ADULT  Goal: Verbalizes/displays adequate comfort level or baseline comfort level  Description: Interventions:  - Encourage patient to monitor pain and request assistance  - Assess pain using appropriate pain scale  - Administer analgesics based on type and severity of pain and evaluate response  - Implement non-pharmacological measures as appropriate and evaluate response  - Consider cultural and social influences on pain and pain management  - Notify physician/advanced practitioner if interventions unsuccessful or patient reports new pain  Outcome: Adequate for Discharge     Problem: INFECTION - ADULT  Goal: Absence or prevention of progression during hospitalization  Description: INTERVENTIONS:  - Assess and monitor for signs and symptoms of infection  - Monitor lab/diagnostic results  - Monitor all insertion sites, i e  indwelling lines, tubes, and drains  - Monitor endotracheal if appropriate and nasal secretions for changes in amount and color  - [place] appropriate cooling/warming therapies per order  - Administer medications as ordered  - Instruct and encourage patient and family to use good hand hygiene technique  - Identify and instruct in appropriate isolation precautions for identified infection/condition  Outcome: Adequate for Discharge  Goal: Absence of fever/infection during neutropenic period  Description: INTERVENTIONS:  - Monitor WBC    Outcome: Adequate for Discharge     Problem: SAFETY ADULT  Goal: Patient will remain free of falls  Description: INTERVENTIONS:  - Educate patient/family on patient safety including physical limitations  - Instruct patient to call for assistance with activity   - Consult OT/PT to assist with strengthening/mobility   - Keep Call bell within reach  - Keep bed low and locked with side rails adjusted as appropriate  - Keep care items and personal belongings within reach  - Initiate and maintain comfort rounds  - Make Fall Risk Sign visible to staff  - Apply yellow socks and bracelet for high fall risk patients  - Consider moving patient to room near nurses station  Outcome: Adequate for Discharge  Goal: Maintain or return to baseline ADL function  Description: INTERVENTIONS:  -  Assess patient's ability to carry out ADLs; assess patient's baseline for ADL function and identify physical deficits which impact ability to perform ADLs (bathing, care of mouth/teeth, toileting, grooming, dressing, etc )  - Assess/evaluate cause of self-care deficits   - Assess range of motion  - Assess patient's mobility; develop plan if impaired  - Assess patient's need for assistive devices and provide as appropriate  - Encourage maximum independence but intervene and supervise when necessary  - Involve family in performance of ADLs  - Assess for home care needs following discharge   - Consider OT consult to assist with ADL evaluation and planning for discharge  - Provide patient education as appropriate  Outcome: Adequate for Discharge  Goal: Maintains/Returns to pre admission functional level  Description: INTERVENTIONS:  - Perform BMAT or MOVE assessment daily    - Set and communicate daily mobility goal to care team and patient/family/caregiver     - Collaborate with rehabilitation services on mobility goals if consulted  - Out of bed for toileting  - Record patient progress and toleration of activity level   Outcome: Adequate for Discharge     Problem: DISCHARGE PLANNING  Goal: Discharge to home or other facility with appropriate resources  Description: INTERVENTIONS:  - Identify barriers to discharge w/patient and caregiver  - Arrange for needed discharge resources and transportation as appropriate  - Identify discharge learning needs (meds, wound care, etc )  - Arrange for interpretive services to assist at discharge as needed  - Refer to Case Management Department for coordinating discharge planning if the patient needs post-hospital services based on physician/advanced practitioner order or complex needs related to functional status, cognitive ability, or social support system  Outcome: Adequate for Discharge     Problem: Knowledge Deficit  Goal: Patient/family/caregiver demonstrates understanding of disease process, treatment plan, medications, and discharge instructions  Description: Complete learning assessment and assess knowledge base    Interventions:  - Provide teaching at level of understanding  - Provide teaching via preferred learning methods  Outcome: Adequate for Discharge     Problem: POSTPARTUM  Goal: Experiences normal postpartum course  Description: INTERVENTIONS:  - Monitor maternal vital signs  - Assess uterine involution and lochia  Outcome: Adequate for Discharge  Goal: Appropriate maternal -  bonding  Description: INTERVENTIONS:  - Identify family support  - Assess for appropriate maternal/infant bonding   -Encourage maternal/infant bonding opportunities  - Referral to  or  as needed  Outcome: Adequate for Discharge  Goal: Establishment of infant feeding pattern  Description: INTERVENTIONS:  - Assess breast/bottle feeding  - Refer to lactation as needed  Outcome: Adequate for Discharge  Goal: Incision(s), wounds(s) or drain site(s) healing without S/S of infection  Description: INTERVENTIONS  - Assess and document dressing, incision, wound bed, drain sites and surrounding tissue  - Provide patient and family education  Outcome: Adequate for Discharge

## 2023-03-03 NOTE — PLAN OF CARE
Problem: PAIN - ADULT  Goal: Verbalizes/displays adequate comfort level or baseline comfort level  Description: Interventions:  - Encourage patient to monitor pain and request assistance  - Assess pain using appropriate pain scale  - Administer analgesics based on type and severity of pain and evaluate response  - Implement non-pharmacological measures as appropriate and evaluate response  - Consider cultural and social influences on pain and pain management  - Notify physician/advanced practitioner if interventions unsuccessful or patient reports new pain  Outcome: Progressing     Problem: INFECTION - ADULT  Goal: Absence or prevention of progression during hospitalization  Description: INTERVENTIONS:  - Assess and monitor for signs and symptoms of infection  - Monitor lab/diagnostic results  - Monitor all insertion sites, i e  indwelling lines, tubes, and drains  - Monitor endotracheal if appropriate and nasal secretions for changes in amount and color  - [place] appropriate cooling/warming therapies per order  - Administer medications as ordered  - Instruct and encourage patient and family to use good hand hygiene technique  - Identify and instruct in appropriate isolation precautions for identified infection/condition  Outcome: Progressing  Goal: Absence of fever/infection during neutropenic period  Description: INTERVENTIONS:  - Monitor WBC    Outcome: Progressing     Problem: SAFETY ADULT  Goal: Patient will remain free of falls  Description: INTERVENTIONS:  - Educate patient/family on patient safety including physical limitations  - Instruct patient to call for assistance with activity   - Consult OT/PT to assist with strengthening/mobility   - Keep Call bell within reach  - Keep bed low and locked with side rails adjusted as appropriate  - Keep care items and personal belongings within reach  - Initiate and maintain comfort rounds  - Make Fall Risk Sign visible to staff  - Apply yellow socks and bracelet for high fall risk patients  - Consider moving patient to room near nurses station  Outcome: Progressing  Goal: Maintain or return to baseline ADL function  Description: INTERVENTIONS:  -  Assess patient's ability to carry out ADLs; assess patient's baseline for ADL function and identify physical deficits which impact ability to perform ADLs (bathing, care of mouth/teeth, toileting, grooming, dressing, etc )  - Assess/evaluate cause of self-care deficits   - Assess range of motion  - Assess patient's mobility; develop plan if impaired  - Assess patient's need for assistive devices and provide as appropriate  - Encourage maximum independence but intervene and supervise when necessary  - Involve family in performance of ADLs  - Assess for home care needs following discharge   - Consider OT consult to assist with ADL evaluation and planning for discharge  - Provide patient education as appropriate  Outcome: Progressing  Goal: Maintains/Returns to pre admission functional level  Description: INTERVENTIONS:  - Perform BMAT or MOVE assessment daily    - Set and communicate daily mobility goal to care team and patient/family/caregiver     - Collaborate with rehabilitation services on mobility goals if consulted  - Out of bed for toileting  - Record patient progress and toleration of activity level   Outcome: Progressing     Problem: DISCHARGE PLANNING  Goal: Discharge to home or other facility with appropriate resources  Description: INTERVENTIONS:  - Identify barriers to discharge w/patient and caregiver  - Arrange for needed discharge resources and transportation as appropriate  - Identify discharge learning needs (meds, wound care, etc )  - Arrange for interpretive services to assist at discharge as needed  - Refer to Case Management Department for coordinating discharge planning if the patient needs post-hospital services based on physician/advanced practitioner order or complex needs related to functional status, cognitive ability, or social support system  Outcome: Progressing     Problem: Knowledge Deficit  Goal: Patient/family/caregiver demonstrates understanding of disease process, treatment plan, medications, and discharge instructions  Description: Complete learning assessment and assess knowledge base    Interventions:  - Provide teaching at level of understanding  - Provide teaching via preferred learning methods  Outcome: Progressing     Problem: POSTPARTUM  Goal: Experiences normal postpartum course  Description: INTERVENTIONS:  - Monitor maternal vital signs  - Assess uterine involution and lochia  Outcome: Progressing  Goal: Appropriate maternal -  bonding  Description: INTERVENTIONS:  - Identify family support  - Assess for appropriate maternal/infant bonding   -Encourage maternal/infant bonding opportunities  - Referral to  or  as needed  Outcome: Progressing  Goal: Establishment of infant feeding pattern  Description: INTERVENTIONS:  - Assess breast/bottle feeding  - Refer to lactation as needed  Outcome: Progressing  Goal: Incision(s), wounds(s) or drain site(s) healing without S/S of infection  Description: INTERVENTIONS  - Assess and document dressing, incision, wound bed, drain sites and surrounding tissue  - Provide patient and family education  Outcome: Progressing

## 2023-03-03 NOTE — PROGRESS NOTES
Post- Progress note    Patient is post-op day 2 from a  delivery       Pain: controlled  Tolerating Oral Intake: yes  Voiding: yes  Flatus:yes  Ambulating: yes  Breastfeeding: well  Chest Pain: no  Shortness of Breath: no  Leg Pain/Discomfort: no  Lochia: minimal      Objective:   Vitals:    23 0801   BP: 133/87   Pulse: 83   Resp: 16   Temp: 98 1 °F (36 7 °C)   SpO2: 98%       Intake/Output Summary (Last 24 hours) at 3/3/2023 2212  Last data filed at 3/2/2023 1718  Gross per 24 hour   Intake --   Output 1000 ml   Net -1000 ml       Physical Exam:  General: alert awake oriented  Cardiovascular: RRR  Lungs: clear  Abdomen:soft, nontender, minimal distention  Fundus: below umbilicus  Incision: clean dry intact  Lower extremeties: no evidence of DBT    Assessment and Plan:  34y o  year-old , postoperative day2 status-post  delivery    Continue routine post op care  Encourage ambulation  Patient desires to be discharged to home today    Kaz Jauregui DO

## 2023-03-03 NOTE — DISCHARGE SUMMARY
Discharge Summary - OB/GYN   Warden Valles 34 y o  female MRN: [id number]  Unit/Bed#: -01 Encounter: [id number]      Admission Date: 3/1/2023     Discharge Date: 3/3/2023    Admitting Diagnosis:   1  Pregnancy at 38w4d  2  History of prior LTCS    Discharge Diagnosis:   Same, delivered    Procedures: repeat  section, low transverse incision    Delivery Attending: Von Quintana MD  Discharge Attending: NATALIE Carpio  Hospital Course:     Warden Valles is a 34 y o   at 38w4d wks who was initially admitted for SROM and early labor  She delivered a viable male  on 3/1/2023 at 60 124 37 75  Weight 7lbs 7 9oz via repeat  section, low transverse incision  Apgars were 8 (1 min) and 9 (5 min)   was transferred to  nursery  Patient tolerated the procedure well and was transferred to recovery in stable condition  Her post-operative course was uncomplicated  Preoperative hemoglobin was 9 4, postoperative was 9 7  Her postoperative pain was well controlled with oral analgesics  On day of discharge, she was ambulating and able to reasonably perform all ADLs  She was voiding and had appropriate bowel function  Pain was well controlled  She was discharged home on post-operative day #2 without complications  Patient was instructed to follow up with her OB as an outpatient and was given appropriate warnings to call provider if she develops signs of infection or uncontrolled pain  Complications: none apparent    Condition at discharge: good     Discharge instructions/Information to patient and family:   See after visit summary for information provided to patient and family  Provisions for Follow-Up Care:  See after visit summary for information related to follow-up care and any pertinent home health orders  Disposition: Home    Planned Readmission: No    Discharge Medications:   For a complete list of the patient's medications, please refer to her med rec     9:15 AM  3/3/2023

## 2023-03-03 NOTE — PLAN OF CARE
Problem: PAIN - ADULT  Goal: Verbalizes/displays adequate comfort level or baseline comfort level  Description: Interventions:  - Encourage patient to monitor pain and request assistance  - Assess pain using appropriate pain scale  - Administer analgesics based on type and severity of pain and evaluate response  - Implement non-pharmacological measures as appropriate and evaluate response  - Consider cultural and social influences on pain and pain management  - Notify physician/advanced practitioner if interventions unsuccessful or patient reports new pain  3/3/2023 0952 by Akiko Sinha RN  Outcome: Completed  3/3/2023 0812 by Akiko Sinha RN  Outcome: Adequate for Discharge     Problem: INFECTION - ADULT  Goal: Absence or prevention of progression during hospitalization  Description: INTERVENTIONS:  - Assess and monitor for signs and symptoms of infection  - Monitor lab/diagnostic results  - Monitor all insertion sites, i e  indwelling lines, tubes, and drains  - Monitor endotracheal if appropriate and nasal secretions for changes in amount and color  - [place] appropriate cooling/warming therapies per order  - Administer medications as ordered  - Instruct and encourage patient and family to use good hand hygiene technique  - Identify and instruct in appropriate isolation precautions for identified infection/condition  3/3/2023 0952 by Akiko Sinha RN  Outcome: Completed  3/3/2023 0812 by Akiko Sinha RN  Outcome: Adequate for Discharge  Goal: Absence of fever/infection during neutropenic period  Description: INTERVENTIONS:  - Monitor WBC    3/3/2023 0952 by Akiko Sinha RN  Outcome: Completed  3/3/2023 0812 by Akiko Sinha RN  Outcome: Adequate for Discharge     Problem: SAFETY ADULT  Goal: Patient will remain free of falls  Description: INTERVENTIONS:  - Educate patient/family on patient safety including physical limitations  - Instruct patient to call for assistance with activity   - Consult OT/PT to assist with strengthening/mobility   - Keep Call bell within reach  - Keep bed low and locked with side rails adjusted as appropriate  - Keep care items and personal belongings within reach  - Initiate and maintain comfort rounds  - Make Fall Risk Sign visible to staff  - Apply yellow socks and bracelet for high fall risk patients  - Consider moving patient to room near nurses station  3/3/2023 0952 by Robin Chinchilla RN  Outcome: Completed  3/3/2023 0812 by Robin Chinchilla RN  Outcome: Adequate for Discharge  Goal: Maintain or return to baseline ADL function  Description: INTERVENTIONS:  -  Assess patient's ability to carry out ADLs; assess patient's baseline for ADL function and identify physical deficits which impact ability to perform ADLs (bathing, care of mouth/teeth, toileting, grooming, dressing, etc )  - Assess/evaluate cause of self-care deficits   - Assess range of motion  - Assess patient's mobility; develop plan if impaired  - Assess patient's need for assistive devices and provide as appropriate  - Encourage maximum independence but intervene and supervise when necessary  - Involve family in performance of ADLs  - Assess for home care needs following discharge   - Consider OT consult to assist with ADL evaluation and planning for discharge  - Provide patient education as appropriate  3/3/2023 0952 by Robin Chinchilla RN  Outcome: Completed  3/3/2023 0812 by Robin Chinchilla RN  Outcome: Adequate for Discharge  Goal: Maintains/Returns to pre admission functional level  Description: INTERVENTIONS:  - Perform BMAT or MOVE assessment daily    - Set and communicate daily mobility goal to care team and patient/family/caregiver     - Collaborate with rehabilitation services on mobility goals if consulted  - Out of bed for toileting  - Record patient progress and toleration of activity level   3/3/2023 0952 by Robin Chinchilla RN  Outcome: Completed  3/3/2023 0812 by Robin Chinchilla RN  Outcome: Adequate for Discharge     Problem: DISCHARGE PLANNING  Goal: Discharge to home or other facility with appropriate resources  Description: INTERVENTIONS:  - Identify barriers to discharge w/patient and caregiver  - Arrange for needed discharge resources and transportation as appropriate  - Identify discharge learning needs (meds, wound care, etc )  - Arrange for interpretive services to assist at discharge as needed  - Refer to Case Management Department for coordinating discharge planning if the patient needs post-hospital services based on physician/advanced practitioner order or complex needs related to functional status, cognitive ability, or social support system  3/3/2023 0952 by Swetha Malloy RN  Outcome: Completed  3/3/2023 0812 by Swehta Malloy RN  Outcome: Adequate for Discharge     Problem: Knowledge Deficit  Goal: Patient/family/caregiver demonstrates understanding of disease process, treatment plan, medications, and discharge instructions  Description: Complete learning assessment and assess knowledge base    Interventions:  - Provide teaching at level of understanding  - Provide teaching via preferred learning methods  3/3/2023 0952 by Swetha Maloly RN  Outcome: Completed  3/3/2023 0812 by Swetha Malloy RN  Outcome: Adequate for Discharge     Problem: POSTPARTUM  Goal: Experiences normal postpartum course  Description: INTERVENTIONS:  - Monitor maternal vital signs  - Assess uterine involution and lochia  3/3/2023 0952 by Swetha Malloy RN  Outcome: Completed  3/3/2023 0812 by wSetha Malloy RN  Outcome: Adequate for Discharge  Goal: Appropriate maternal -  bonding  Description: INTERVENTIONS:  - Identify family support  - Assess for appropriate maternal/infant bonding   -Encourage maternal/infant bonding opportunities  - Referral to  or  as needed  3/3/[phone] by Swetha Malloy RN  Outcome: Completed  3/3/2023 0812 by Swetha Malloy RN  Outcome: Adequate for Discharge  Goal: Establishment of infant feeding pattern  Description: INTERVENTIONS:  - Assess breast/bottle feeding  - Refer to lactation as needed  3/3/2023 0952 by Robbie Balbuena RN  Outcome: Completed  3/3/2023 0812 by Robbie Balbuena RN  Outcome: Adequate for Discharge  Goal: Incision(s), wounds(s) or drain site(s) healing without S/S of infection  Description: INTERVENTIONS  - Assess and document dressing, incision, wound bed, drain sites and surrounding tissue  - Provide patient and family education  3/3/2023 0952 by Robbie Balbuena RN  Outcome: Completed  3/3/2023 0812 by Robbie Balbuena RN  Outcome: Adequate for Discharge

## 2023-03-03 NOTE — LACTATION NOTE
This note was copied from a baby's chart  Met with parents to follow up from yesterday's encounter and to discuss the Breastfeeding Discharge Booklet  Baby is currently at a 8 3% weight loss, having wet and dirty diapers appropriate for his age and 24 hour bilirubin was in the low intermediate range  Mother discussed that baby has been latching more easily and doing better with feedings  Showed the feeding log to could be continued using once home for up to the week and discussed the importance of ensuring that baby feeds 8-12x in 24 hours and that baby has 6-8 wet diapers as well as 3-4 soiled diapers (looking for stool transition from meconium to a yellow/gold seedy loose stool)  Mother given resources to look up medications to ensure they are safe with breastfeeding, by communicating with the [facility], [address] as well as using [employer] (assisted mother to pin to home screen on personal phone)    Mother is aware of engorgement time frame (when mature milk comes in) and management as well as how to deal with conditions that may occur while breastfeeding (plugged ducts, milk blebs and mastitis) and when is appropriate to communicate with her OB/GYN and/or a lactation consultant  Mother is comfortable with how to set up a pump, how to cycle (stimulation vs expression phases during a pumping session), flange fit, milk storage and cleaning  Mother shown handouts for tips on pumping when returning to work and paced bottle feeding that was discussed and demonstrated  Mother shown community resources for continued support in breastfeeding once discharged home  She was encouraged to communicate with [address] [place] [address], [facility] for lactation home visits and/or with her baby's pediatrician for lactation support/services that could be offered in the practice  Parents were encouraged to call for further questions that arise prior to discharge

## 2023-03-09 LAB — PLACENTA IN STORAGE: NORMAL

## 2023-07-13 NOTE — PLAN OF CARE
48yoF seen in May for H. pylori infection.   She noted she has had mild discomfort/unsettled stomach for over a year. Symptoms were intermittent, no obvious triggers and no assoc vomiting, anorexia, jaundice, weight loss, dysphagia, change in bowel habits or GIB. Saw PCP in July 2022 had a positive H. pylori breath test. She was treated with triple therapy but infection didn't clear on repeat testing. CBC/CMP WNL aside from elevated glucose.  No prior EGD or colonoscopy. Mom passed with stomach CA 2' HP in 2018 (our pt). No FH colon CA EGD 6/30/2023 granular mucosa in gastric body ow normal, bx chronic active gastritis with HP Colon same day 3 small polyps and IH, TA and SSA.  No change in sx. Problem: PAIN - ADULT  Goal: Verbalizes/displays adequate comfort level or baseline comfort level  Description: Interventions:  - Encourage patient to monitor pain and request assistance  - Assess pain using appropriate pain scale  - Administer analgesics based on type and severity of pain and evaluate response  - Implement non-pharmacological measures as appropriate and evaluate response  - Consider cultural and social influences on pain and pain management  - Notify physician/advanced practitioner if interventions unsuccessful or patient reports new pain  Outcome: Progressing     Problem: INFECTION - ADULT  Goal: Absence or prevention of progression during hospitalization  Description: INTERVENTIONS:  - Assess and monitor for signs and symptoms of infection  - Monitor lab/diagnostic results  - Monitor all insertion sites, i e  indwelling lines, tubes, and drains  - Monitor endotracheal if appropriate and nasal secretions for changes in amount and color  - [place] appropriate cooling/warming therapies per order  - Administer medications as ordered  - Instruct and encourage patient and family to use good hand hygiene technique  - Identify and instruct in appropriate isolation precautions for identified infection/condition  Outcome: Progressing  Goal: Absence of fever/infection during neutropenic period  Description: INTERVENTIONS:  - Monitor WBC    Outcome: Progressing     Problem: SAFETY ADULT  Goal: Patient will remain free of falls  Description: INTERVENTIONS:  - Educate patient/family on patient safety including physical limitations  - Instruct patient to call for assistance with activity   - Consult OT/PT to assist with strengthening/mobility   - Keep Call bell within reach  - Keep bed low and locked with side rails adjusted as appropriate  - Keep care items and personal belongings within reach  - Initiate and maintain comfort rounds  - Make Fall Risk Sign visible to staff    - Apply yellow socks and bracelet for high fall risk patients  - Consider moving patient to room near nurses station  Outcome: Progressing  Goal: Maintain or return to baseline ADL function  Description: INTERVENTIONS:  -  Assess patient's ability to carry out ADLs; assess patient's baseline for ADL function and identify physical deficits which impact ability to perform ADLs (bathing, care of mouth/teeth, toileting, grooming, dressing, etc )  - Assess/evaluate cause of self-care deficits   - Assess range of motion  - Assess patient's mobility; develop plan if impaired  - Assess patient's need for assistive devices and provide as appropriate  - Encourage maximum independence but intervene and supervise when necessary  - Involve family in performance of ADLs  - Assess for home care needs following discharge   - Consider OT consult to assist with ADL evaluation and planning for discharge  - Provide patient education as appropriate  Outcome: Progressing  Goal: Maintains/Returns to pre admission functional level  Description: INTERVENTIONS:  - Perform BMAT or MOVE assessment daily    - Set and communicate daily mobility goal to care team and patient/family/caregiver     - Collaborate with rehabilitation services on mobility goals if consulted    - Out of bed for toileting  - Record patient progress and toleration of activity level   Outcome: Progressing     Problem: DISCHARGE PLANNING  Goal: Discharge to home or other facility with appropriate resources  Description: INTERVENTIONS:  - Identify barriers to discharge w/patient and caregiver  - Arrange for needed discharge resources and transportation as appropriate  - Identify discharge learning needs (meds, wound care, etc )  - Arrange for interpretive services to assist at discharge as needed  - Refer to Case Management Department for coordinating discharge planning if the patient needs post-hospital services based on physician/advanced practitioner order or complex needs related to functional status, cognitive ability, or social support system  Outcome: Progressing     Problem: Knowledge Deficit  Goal: Patient/family/caregiver demonstrates understanding of disease process, treatment plan, medications, and discharge instructions  Description: Complete learning assessment and assess knowledge base    Interventions:  - Provide teaching at level of understanding  - Provide teaching via preferred learning methods  Outcome: Progressing     Problem: POSTPARTUM  Goal: Experiences normal postpartum course  Description: INTERVENTIONS:  - Monitor maternal vital signs  - Assess uterine involution and lochia  Outcome: Progressing  Goal: Appropriate maternal -  bonding  Description: INTERVENTIONS:  - Identify family support  - Assess for appropriate maternal/infant bonding   -Encourage maternal/infant bonding opportunities  - Referral to  or  as needed  Outcome: Progressing  Goal: Establishment of infant feeding pattern  Description: INTERVENTIONS:  - Assess breast/bottle feeding  - Refer to lactation as needed  Outcome: Progressing  Goal: Incision(s), wounds(s) or drain site(s) healing without S/S of infection  Description: INTERVENTIONS  - Assess and document dressing, incision, wound bed, drain sites and surrounding tissue  - Provide patient and family education  - Perform skin care/dressing changes every   Outcome: Progressing

## 2023-10-30 NOTE — PATIENT INSTRUCTIONS
Thank you for choosing us for your  care today  If you have any questions about your ultrasound or care, please do not hesitate to contact us or your primary obstetrician  Some general instructions for your pregnancy are:    Protect against coronavirus: get vaccinated - pregnant women are increased risk of severe COVID  Notify your primary care doctor if you have any symptoms  Exercise: Aim for 22 minutes per day (150 minutes per week) of regular exercise  Walking is great! Nutrition: aim for calcium-rich and iron-rich foods as well as healthy sources of protein  Learn about Preeclampsia: preeclampsia is a common, serious high blood pressure complication in pregnancy  A blood pressure of 551QWDA (systolic or top number) or 11WSZR (diastolic or bottom number) is not normal and needs evaluation by your doctor  Aspirin is sometimes prescribed in early pregnancy to prevent preeclampsia in women with risk factors - ask your obstetrician if you should be on this medication  If you smoke, try to reduce how many cigarettes you smoke or try to quit completely  Do not vape  Other warning signs to watch out for in pregnancy or postpartum: chest pain, obstructed breathing or shortness of breath, seizures, thoughts of hurting yourself or your baby, bleeding, a painful or swollen leg, fever, or headache (see [facility] POST-BIRTH Warning Signs campaign)  If these happen call 911  Itching is also not normal in pregnancy and if you experience this, especially over your hands and feet, potentially worse at night, notify your doctors 
Student

## 2023-11-14 ENCOUNTER — OFFICE VISIT (OUTPATIENT)
Dept: OBGYN CLINIC | Facility: CLINIC | Age: 33
End: 2023-11-14
Payer: COMMERCIAL

## 2023-11-14 VITALS — SYSTOLIC BLOOD PRESSURE: 116 MMHG | DIASTOLIC BLOOD PRESSURE: 68 MMHG | BODY MASS INDEX: 24.63 KG/M2 | WEIGHT: 148 LBS

## 2023-11-14 DIAGNOSIS — N63.22 BREAST LUMP ON LEFT SIDE AT 10 O'CLOCK POSITION: Primary | ICD-10-CM

## 2023-11-14 PROCEDURE — 99213 OFFICE O/P EST LOW 20 MIN: CPT | Performed by: NURSE PRACTITIONER

## 2023-11-14 NOTE — PROGRESS NOTES
Assessment/Plan:             Subjective:      Patient ID: Kali Carlin is a 28 y.o. female. Here for eval lump in left breast seen last 4/2023 post partum Notes ? Breast/chest mass x 2 months Nontender No erythema Has weaned down to breast feeding just at night No excessive caffeine intake Brother recently dx with hodgkins lymphoma with large chest mass Has appt for [address] 1/2024         The following portions of the patient's history were reviewed and updated as appropriate: allergies, current medications, past family history, past medical history, past social history, past surgical history, and problem list.    Review of Systems   Constitutional:  Negative for chills, fatigue and fever. Chest lump nontender, no erythema          Objective:      /68 (BP Location: Left arm, Patient Position: Sitting, Cuff Size: Standard)   Wt 67.1 kg (148 lb)   LMP 10/23/2023 (Exact Date)   Breastfeeding Yes   BMI 24.63 kg/m²          Physical Exam  Vitals and nursing note reviewed. Constitutional:       General: She is not in acute distress. Appearance: Normal appearance. HENT:      Head: Normocephalic and atraumatic. Pulmonary:      Effort: Pulmonary effort is normal.   Chest:   Breasts:     Breasts are symmetrical.      Right: Normal. No mass, nipple discharge, skin change or tenderness. Left: Mass present. No nipple discharge, skin change or tenderness. Musculoskeletal:         General: Normal range of motion. Lymphadenopathy:      Upper Body:      Right upper body: No axillary adenopathy. Left upper body: No axillary adenopathy. Skin:     General: Skin is warm and dry. Neurological:      Mental Status: She is alert and oriented to person, place, and time. Psychiatric:         Mood and Affect: Mood normal.         Behavior: Behavior normal.         Thought Content:  Thought content normal.         Judgment: Judgment normal.

## 2023-12-01 ENCOUNTER — TRANSCRIBE ORDERS (OUTPATIENT)
Dept: SCHEDULING | Age: 33
End: 2023-12-01

## 2023-12-01 DIAGNOSIS — N63.22 UNSPECIFIED LUMP IN THE LEFT BREAST, UPPER INNER QUADRANT: Primary | ICD-10-CM

## 2024-01-10 DIAGNOSIS — N63.22 BREAST LUMP ON LEFT SIDE AT 10 O'CLOCK POSITION: ICD-10-CM

## 2024-02-05 ENCOUNTER — OFFICE VISIT (OUTPATIENT)
Dept: OBGYN CLINIC | Facility: CLINIC | Age: 34
End: 2024-02-05
Payer: COMMERCIAL

## 2024-02-05 VITALS
BODY MASS INDEX: 25.16 KG/M2 | HEIGHT: 65 IN | WEIGHT: 151 LBS | SYSTOLIC BLOOD PRESSURE: 118 MMHG | DIASTOLIC BLOOD PRESSURE: 70 MMHG

## 2024-02-05 DIAGNOSIS — Z12.4 ENCOUNTER FOR PAPANICOLAOU SMEAR FOR CERVICAL CANCER SCREENING: ICD-10-CM

## 2024-02-05 DIAGNOSIS — O24.410 DIET CONTROLLED GESTATIONAL DIABETES MELLITUS (GDM) IN THIRD TRIMESTER: ICD-10-CM

## 2024-02-05 DIAGNOSIS — Z01.419 ENCOUNTER FOR GYNECOLOGICAL EXAMINATION WITHOUT ABNORMAL FINDING: Primary | ICD-10-CM

## 2024-02-05 PROCEDURE — S0612 ANNUAL GYNECOLOGICAL EXAMINA: HCPCS | Performed by: NURSE PRACTITIONER

## 2024-02-05 NOTE — PATIENT INSTRUCTIONS
Pap every 5 years if normal, sexually transmitted infection testing as indicated, exercise most days of week, obtain appropriate diet and hydration, Calcium 1000mg + 600 vit D daily, birth control as directed.  Annual mammogram starting at age 40, monthly breast self exam.

## 2024-02-05 NOTE — PROGRESS NOTES
Assessment/Plan:  Pap every 5 years if normal, sexually transmitted infection testing as indicated, exercise most days of week, obtain appropriate diet and hydration, Calcium 1000mg + 600 vit D daily, birth control as directed.  Annual mammogram starting at age 40, monthly breast self exam.        Diagnoses and all orders for this visit:    Encounter for gynecological examination without abnormal finding  -     Cancel: IGP, Aptima HPV, Rfx 16/18,45  -     IGP, Aptima HPV, Rfx 16/18,45    Diet controlled gestational diabetes mellitus (GDM) in third trimester    Encounter for Papanicolaou smear for cervical cancer screening  -     Cancel: IGP, Aptima HPV, Rfx 16/18,45  -     IGP, Aptima HPV, Rfx 16/18,45          Subjective:      Patient ID: Selena Wong is a 33 y.o. female.    Here for annual gyn  Here for eval lump in left breast seen last 2023 post partum Notes ? Breast/chest mass x 2 months Nontender No erythema Has weaned down to breast feeding just at night No excessive caffeine intake Had dx mammo and US Normal Brother recently dx with hodgkins lymphoma with large chest mass  Periods past 2 months regular but heavier than prior to kids.  PAP 2019 neg 2018 neg No h/o abn pap   vasectomy         The following portions of the patient's history were reviewed and updated as appropriate: allergies, current medications, past family history, past medical history, past social history, past surgical history, and problem list.    Review of Systems   Constitutional:  Negative for fatigue and unexpected weight change.   Gastrointestinal:  Negative for abdominal distention, abdominal pain, constipation and diarrhea.   Genitourinary:  Negative for difficulty urinating, dyspareunia, dysuria, frequency, genital sores, menstrual problem, pelvic pain, urgency, vaginal bleeding, vaginal discharge and vaginal pain.   Neurological:  Negative for headaches.   Psychiatric/Behavioral: Negative.  Negative for  "dysphoric mood. The patient is not nervous/anxious.          Objective:      /70 (BP Location: Right arm, Patient Position: Sitting, Cuff Size: Standard)   Ht 5' 5\" (1.651 m)   Wt 68.5 kg (151 lb)   LMP 01/25/2024   Breastfeeding No   BMI 25.13 kg/m²          Physical Exam  Vitals and nursing note reviewed.   Constitutional:       General: She is not in acute distress.     Appearance: Normal appearance.   HENT:      Head: Normocephalic and atraumatic.   Pulmonary:      Effort: Pulmonary effort is normal.   Chest:   Breasts:     Breasts are symmetrical.      Right: Normal. No mass, nipple discharge, skin change or tenderness.      Left: Normal. No mass, nipple discharge, skin change or tenderness.   Abdominal:      General: There is no distension.      Palpations: Abdomen is soft.      Tenderness: There is no abdominal tenderness. There is no guarding or rebound.   Genitourinary:     General: Normal vulva.      Exam position: Lithotomy position.      Labia:         Right: No rash, tenderness, lesion or injury.         Left: No rash, tenderness, lesion or injury.       Urethra: No prolapse, urethral pain, urethral swelling or urethral lesion.      Vagina: Normal. No erythema or lesions.      Cervix: No cervical motion tenderness, discharge, lesion or cervical bleeding.      Uterus: Normal.       Adnexa: Right adnexa normal and left adnexa normal.        Right: No mass or tenderness.          Left: No mass or tenderness.        Rectum: No mass or external hemorrhoid.      Comments: PAP from cervix   Musculoskeletal:         General: Normal range of motion.   Lymphadenopathy:      Upper Body:      Right upper body: No axillary adenopathy.      Left upper body: No axillary adenopathy.      Lower Body: No right inguinal adenopathy. No left inguinal adenopathy.   Skin:     General: Skin is warm and dry.   Neurological:      Mental Status: She is alert and oriented to person, place, and time.   Psychiatric:       "   Mood and Affect: Mood normal.         Behavior: Behavior normal.         Thought Content: Thought content normal.         Judgment: Judgment normal.

## 2024-02-09 LAB
CYTOLOGIST CVX/VAG CYTO: NORMAL
DX ICD CODE: NORMAL
HPV GENOTYPE REFLEX: NORMAL
HPV I/H RISK 4 DNA CVX QL PROBE+SIG AMP: NEGATIVE
Lab: NORMAL
OTHER STN SPEC: NORMAL
PATH REPORT.FINAL DX SPEC: NORMAL
SL AMB NOTE:: NORMAL
SL AMB SPECIMEN ADEQUACY: NORMAL
SL AMB TEST METHODOLOGY: NORMAL

## 2025-02-09 NOTE — PROGRESS NOTES
Assessment/Plan:  Pap every 5 years if normal,  exercise most days of week, obtain appropriate diet and hydration   Calcium 1000mg + 600 vit D daily  Annual mammogram starting at age 40, monthly breast self exam.   Heavy periods Trial Motrin 600-800 mg every 6 hours with food  If not effective Lysteda 2 tabs 3 times a day heavy days of period  Hormonal IUD Mirena        Diagnoses and all orders for this visit:    Encounter for gynecological examination without abnormal finding    Migraine without aura and without status migrainosus, not intractable    Menorrhagia with regular cycle    Other orders  -     Liquid-based pap, screening  -     Escitalopram Oxalate (LEXAPRO PO); Take 50 mg by mouth daily          Subjective:      Patient ID: Selena Wong is a 34 y.o. female.    Here for annual gyn  lump in left breast  ? Breast/chest mass x 2 months Nontender No erythema Has weaned down to breast feeding just at night No excessive caffeine intake Had dx mammo and US2023 normal  Brother recently dx with hodgkins lymphoma with large chest mass doing well recent PET clear   Periods  regular but heavier than prior to kids.  PAP 2024 neg/neg HPV prior  neg 2018 neg No h/o abn pap   vasectomy             The following portions of the patient's history were reviewed and updated as appropriate: allergies, current medications, past family history, past medical history, past social history, past surgical history, and problem list.    Review of Systems   Constitutional:  Negative for fatigue and unexpected weight change.   Gastrointestinal:  Negative for abdominal distention, abdominal pain, constipation and diarrhea.   Genitourinary:  Positive for menstrual problem. Negative for difficulty urinating, dyspareunia, dysuria, frequency, genital sores, pelvic pain, urgency, vaginal bleeding, vaginal discharge and vaginal pain.   Neurological:  Negative for headaches.   Psychiatric/Behavioral: Negative.   "Negative for dysphoric mood. The patient is not nervous/anxious.          Objective:      /72 (BP Location: Right arm, Patient Position: Sitting, Cuff Size: Standard)   Ht 5' 5\" (1.651 m)   Wt 72.1 kg (159 lb)   LMP 02/08/2025   BMI 26.46 kg/m²          Physical Exam  Vitals and nursing note reviewed.   Constitutional:       General: She is not in acute distress.     Appearance: Normal appearance.   HENT:      Head: Normocephalic and atraumatic.   Pulmonary:      Effort: Pulmonary effort is normal.   Chest:   Breasts:     Breasts are symmetrical.      Right: Normal. No mass, nipple discharge, skin change or tenderness.      Left: Normal. No mass, nipple discharge, skin change or tenderness.   Abdominal:      General: There is no distension.      Palpations: Abdomen is soft.      Tenderness: There is no abdominal tenderness. There is no guarding or rebound.   Genitourinary:     General: Normal vulva.      Exam position: Lithotomy position.      Labia:         Right: No rash, tenderness, lesion or injury.         Left: No rash, tenderness, lesion or injury.       Urethra: No prolapse, urethral pain, urethral swelling or urethral lesion.      Vagina: Normal. No erythema or lesions.      Cervix: No cervical motion tenderness, discharge, lesion or cervical bleeding.      Uterus: Normal.       Adnexa: Right adnexa normal and left adnexa normal.        Right: No mass or tenderness.          Left: No mass or tenderness.        Rectum: No mass or external hemorrhoid.      Comments: Light menses  Musculoskeletal:         General: Normal range of motion.   Lymphadenopathy:      Upper Body:      Right upper body: No axillary adenopathy.      Left upper body: No axillary adenopathy.      Lower Body: No right inguinal adenopathy. No left inguinal adenopathy.   Skin:     General: Skin is warm and dry.   Neurological:      Mental Status: She is alert and oriented to person, place, and time.   Psychiatric:         Mood and " Affect: Mood normal.         Behavior: Behavior normal.         Thought Content: Thought content normal.         Judgment: Judgment normal.

## 2025-02-10 ENCOUNTER — ANNUAL EXAM (OUTPATIENT)
Dept: OBGYN CLINIC | Facility: CLINIC | Age: 35
End: 2025-02-10
Payer: COMMERCIAL

## 2025-02-10 VITALS
BODY MASS INDEX: 26.49 KG/M2 | WEIGHT: 159 LBS | HEIGHT: 65 IN | DIASTOLIC BLOOD PRESSURE: 72 MMHG | SYSTOLIC BLOOD PRESSURE: 112 MMHG

## 2025-02-10 DIAGNOSIS — G43.009 MIGRAINE WITHOUT AURA AND WITHOUT STATUS MIGRAINOSUS, NOT INTRACTABLE: ICD-10-CM

## 2025-02-10 DIAGNOSIS — N92.0 MENORRHAGIA WITH REGULAR CYCLE: ICD-10-CM

## 2025-02-10 DIAGNOSIS — Z01.419 ENCOUNTER FOR GYNECOLOGICAL EXAMINATION WITHOUT ABNORMAL FINDING: Primary | ICD-10-CM

## 2025-02-10 PROCEDURE — S0612 ANNUAL GYNECOLOGICAL EXAMINA: HCPCS | Performed by: NURSE PRACTITIONER

## 2025-02-10 NOTE — PATIENT INSTRUCTIONS
Pap every 5 years if normal,  exercise most days of week, obtain appropriate diet and hydration   Calcium 1000mg + 600 vit D daily  Annual mammogram starting at age 40, monthly breast self exam.   Heavy periods Trial Motrin 600-800 mg every 6 hours with food  If not effective Lysteda 2 tabs 3 times a day heavy days of period  Hormonal IUD Mirena

## 2025-04-02 ENCOUNTER — TELEPHONE (OUTPATIENT)
Age: 35
End: 2025-04-02

## 2025-05-28 ENCOUNTER — OFFICE VISIT (OUTPATIENT)
Dept: OBGYN CLINIC | Facility: CLINIC | Age: 35
End: 2025-05-28
Payer: COMMERCIAL

## 2025-05-28 VITALS
WEIGHT: 157 LBS | DIASTOLIC BLOOD PRESSURE: 68 MMHG | SYSTOLIC BLOOD PRESSURE: 102 MMHG | HEIGHT: 65 IN | BODY MASS INDEX: 26.16 KG/M2

## 2025-05-28 DIAGNOSIS — Z86.69 HISTORY OF MIGRAINE: ICD-10-CM

## 2025-05-28 DIAGNOSIS — N92.0 MENORRHAGIA WITH REGULAR CYCLE: ICD-10-CM

## 2025-05-28 DIAGNOSIS — Z30.09 ENCOUNTER FOR OTHER GENERAL COUNSELING OR ADVICE ON CONTRACEPTION: Primary | ICD-10-CM

## 2025-05-28 PROCEDURE — 99214 OFFICE O/P EST MOD 30 MIN: CPT | Performed by: OBSTETRICS & GYNECOLOGY

## 2025-05-28 NOTE — PROGRESS NOTES
[facility] - [address], [address], [place] [address]    Assessment & Plan  Encounter for other general counseling or advice on contraception    -  Contraception options reviewed.  R&B, indications and mode of administration reviewed  -  Desires Mirena after all progesterone contraception reviewed       Menorrhagia with regular cycle    -  management with motrin/Advil PRN or with contraception reviewed    -  Contraception options reviewed.  R&B, indications and mode of administration reviewed  -  Desires Mirena after all progesterone contraception reviewed       History of migraine     -  estrogen contraception reviewed         I have spent a total time of 30 minutes in caring for this patient on the day of the visit/encounter including Risks and benefits of tx options, Instructions for management, Patient and family education, Importance of tx compliance, Risk factor reductions, Impressions, Counseling / Coordination of care, Documenting in the medical record, Reviewing/placing orders in the medical record (including tests, medications, and/or procedures), and Obtaining or reviewing history  .      Subjective:   Selena Wong is a 34 y.o.  female.    HPI:   Pt presents for contraception consult.  Has heavy menstrual flow with regular cycles.  Denies having dysmenorrhea        Gyn History  Patient's last menstrual period was 2025.       Last pap smear: 2024    She  reports being sexually active and has had partner(s) who are male. She reports using the following methods of birth control/protection: None and Male Sterilization.       OB History      Past Medical History:  2021: Abnormal glucose tolerance test (GTT)      Comment:  1 hr GTT: 159  3 hr GTT: 81  171  132  143 (                values elevated) - no GDM   No date: Anxiety      Comment:  History of Zoloft; currently taking Lexapro 50 mg and                managed by PCP  2013:  "Gonorrhea  2018: History of fourth degree perineal laceration      Comment:  Requested elective LTCS 2021  No date: Migraines      Comment:  managed by neuro; history of Imitrex  2019: Papanicolaou smear      Comment:  neg  2021: Pyelectasis of fetus on prenatal ultrasound      Comment:  Donald Matson 2021 10:19:55 EST > Noted at 20                week US. Plan for repeat US at 28 weeks. Carl Barbosa                2021 11:09:03 EST >  Stable at 4-5mm bilaterally                (Same measurements from 20 weeks)  [facility] consult: \"suggest                notification of Pediatrics to ensure  evaluation                of the urinary tract dilation occurs at 4-6 weeks of                life\"     Past Surgical History:  No date:  SECTION  3/1/2023: [place]  DELIVERY ONLY; N/A      Comment:  Procedure:  SECTION () REPEAT;                 Surgeon: Donald Matson MD;  Location: [facility];  Service:                Obstetrics  : SKIN LESION EXCISION      Comment:  birth sara removed  2018: VAGINAL DELIVERY      Comment:  female     Social History[1]     Current Medications[2]    She is allergic to cat dander..    ROS: Review of Systems   Constitutional:  Negative for activity change, chills, fever and unexpected weight change.   Genitourinary:  Negative for dyspareunia, pelvic pain, vaginal bleeding, vaginal discharge and vaginal pain.       Objective:  /68 (BP Location: Left arm, Patient Position: Sitting, Cuff Size: Standard)   Ht 5' 5\" (1.651 m)   Wt 71.2 kg (157 lb)   LMP 2025   BMI 26.13 kg/m²      Physical Exam           [1]   Social History  Tobacco Use    Smoking status: Never     Passive exposure: Never    Smokeless tobacco: Never   Vaping Use    Vaping status: Never Used   Substance Use Topics    Alcohol use: Not Currently     Comment: socially    Drug use: Never     Comment: No use   [2]   Current Outpatient Medications:     " Escitalopram Oxalate (LEXAPRO PO), Take 50 mg by mouth in the morning., Disp: , Rfl:     Prenatal Vit-Fe Fumarate-FA (PRENATAL VITAMINS PO), Take by mouth (Patient not taking: Reported on 11/14/2023), Disp: , Rfl:     sertraline (ZOLOFT) 50 mg tablet, Take 50 mg by mouth daily (Patient not taking: Reported on 5/28/2025), Disp: , Rfl:

## 2025-07-23 ENCOUNTER — PROCEDURE VISIT (OUTPATIENT)
Dept: OBGYN CLINIC | Facility: CLINIC | Age: 35
End: 2025-07-23
Payer: COMMERCIAL

## 2025-07-23 VITALS
BODY MASS INDEX: 25.66 KG/M2 | HEIGHT: 65 IN | SYSTOLIC BLOOD PRESSURE: 110 MMHG | DIASTOLIC BLOOD PRESSURE: 68 MMHG | WEIGHT: 154 LBS

## 2025-07-23 DIAGNOSIS — Z32.02 NEGATIVE PREGNANCY TEST: ICD-10-CM

## 2025-07-23 DIAGNOSIS — Z30.430 ENCOUNTER FOR IUD INSERTION: Primary | ICD-10-CM

## 2025-07-23 LAB — SL AMB POCT URINE HCG: NEGATIVE

## 2025-07-23 PROCEDURE — 81025 URINE PREGNANCY TEST: CPT | Performed by: NURSE PRACTITIONER

## 2025-07-23 PROCEDURE — 58300 INSERT INTRAUTERINE DEVICE: CPT | Performed by: NURSE PRACTITIONER

## 2025-07-23 NOTE — PATIENT INSTRUCTIONS
Mirena  IUD inserted as requested. Spotty irregular bleeding may persist up to 6 months. Use backup method of birth control ×7 days.  Return to office in 4 weeks after next menses. Call office with any bright red or excessive bleeding, fever, severe pelvic pain or foul discharge. Check string monthly after menses. May repeat motrin dose 4 hours from last dose with food.

## 2025-07-23 NOTE — PROGRESS NOTES
Here for Mirena insertion  vasectomy  IUD Procedure    Date/Time: 2025 11:48 AM    Performed by: RAMÍREZ Azevedo  Authorized by: RAMÍREZ Aezvedo    Other Assisting Provider: Yes (comment)    Written consent obtained?: Yes    Risks and benefits: Risks, benefits and alternatives were discussed    Consent given by:  Patient  Patient states understanding of procedure being performed: Yes    Patient identity confirmed:  Verbally with patient  Select procedure: IUD insertion    IUD Insertion:     Pelvic exam performed: yes      Negative urine pregnancy test: yes      Cervix cleaned and prepped: yes      Speculum placed in vagina: yes      Tenaculum applied to cervix: yes      IUD inserted with no complications: yes      Strings trimmed: yes      Uterus sounded: yes      Uterus sound depth (cm):  7    IUD type:  Levonorgestrel 20 MCG/DAY  Post-procedure:     Patient tolerated procedure well: yes      Patient will follow up after next period: yes    Insertion Comments:       IUD inserted as requested. Spotty irregular bleeding may persist up to 6 months. Use backup method of birth control ×7 days.  Return to office in 4 weeks after next menses. Call office with any bright red or excessive bleeding, fever, severe pelvic pain or foul discharge. Check string monthly after menses. May repeat motrin dose 4 hours from last dose with food.

## 2025-08-18 ENCOUNTER — OFFICE VISIT (OUTPATIENT)
Dept: OBGYN CLINIC | Facility: CLINIC | Age: 35
End: 2025-08-18
Payer: COMMERCIAL

## 2025-08-18 VITALS
WEIGHT: 155 LBS | HEIGHT: 65 IN | DIASTOLIC BLOOD PRESSURE: 70 MMHG | SYSTOLIC BLOOD PRESSURE: 104 MMHG | BODY MASS INDEX: 25.83 KG/M2

## 2025-08-18 DIAGNOSIS — Z30.431 IUD CHECK UP: Primary | ICD-10-CM

## 2025-08-18 PROCEDURE — 99212 OFFICE O/P EST SF 10 MIN: CPT | Performed by: NURSE PRACTITIONER

## (undated) DEVICE — GLOVE PI ULTRA TOUCH SZ.7.0

## (undated) DEVICE — TELFA NON-ADHERENT ABSORBENT DRESSING: Brand: TELFA

## (undated) DEVICE — GLOVE INDICATOR PI UNDERGLOVE SZ 7.5 BLUE

## (undated) DEVICE — ABDOMINAL PAD: Brand: DERMACEA

## (undated) DEVICE — DECANTER: Brand: UNBRANDED

## (undated) DEVICE — SUT VICRYL 4-0 PS-2 18 IN J496G

## (undated) DEVICE — PACK C-SECTION PBDS

## (undated) DEVICE — SUT VICRYL 0 CTX 36 IN J978H

## (undated) DEVICE — Device

## (undated) DEVICE — 3M™ STERI-STRIP™ REINFORCED ADHESIVE SKIN CLOSURES, R1547, 1/2 IN X 4 IN (12 MM X 100 MM), 6 STRIPS/ENVELOPE: Brand: 3M™ STERI-STRIP™

## (undated) DEVICE — SKIN MARKER DUAL TIP WITH RULER CAP, FLEXIBLE RULER AND LABELS: Brand: DEVON

## (undated) DEVICE — SURGIFOAM 7 X 12 SPONGE ABS

## (undated) DEVICE — 3M™ STERI-STRIP™ COMPOUND BENZOIN TINCTURE 40 BAGS/CARTON 4 CARTONS/CASE C1544: Brand: 3M™ STERI-STRIP™

## (undated) DEVICE — CHLORAPREP HI-LITE 26ML ORANGE